# Patient Record
Sex: FEMALE | Race: WHITE | NOT HISPANIC OR LATINO | Employment: FULL TIME | URBAN - METROPOLITAN AREA
[De-identification: names, ages, dates, MRNs, and addresses within clinical notes are randomized per-mention and may not be internally consistent; named-entity substitution may affect disease eponyms.]

---

## 2017-06-04 ENCOUNTER — NURSE TRIAGE (OUTPATIENT)
Dept: NURSING | Facility: CLINIC | Age: 22
End: 2017-06-04

## 2018-01-30 ENCOUNTER — HOSPITAL ENCOUNTER (EMERGENCY)
Facility: CLINIC | Age: 23
Discharge: HOME | End: 2018-01-30
Attending: EMERGENCY MEDICINE | Admitting: EMERGENCY MEDICINE
Payer: COMMERCIAL

## 2018-01-30 ENCOUNTER — APPOINTMENT (OUTPATIENT)
Dept: ULTRASOUND IMAGING | Facility: CLINIC | Age: 23
End: 2018-01-30
Attending: EMERGENCY MEDICINE
Payer: COMMERCIAL

## 2018-01-30 VITALS
WEIGHT: 120 LBS | TEMPERATURE: 98.7 F | SYSTOLIC BLOOD PRESSURE: 105 MMHG | RESPIRATION RATE: 18 BRPM | DIASTOLIC BLOOD PRESSURE: 71 MMHG | OXYGEN SATURATION: 99 % | BODY MASS INDEX: 21.26 KG/M2 | HEART RATE: 84 BPM

## 2018-01-30 DIAGNOSIS — J06.9 VIRAL UPPER RESPIRATORY TRACT INFECTION: ICD-10-CM

## 2018-01-30 DIAGNOSIS — R10.32 ABDOMINAL PAIN, LEFT LOWER QUADRANT: ICD-10-CM

## 2018-01-30 LAB
ALBUMIN SERPL-MCNC: 4 G/DL (ref 3.4–5)
ALBUMIN UR-MCNC: NEGATIVE MG/DL
ALP SERPL-CCNC: 104 U/L (ref 40–150)
ALT SERPL W P-5'-P-CCNC: 26 U/L (ref 0–50)
ANION GAP SERPL CALCULATED.3IONS-SCNC: 7 MMOL/L (ref 3–14)
APPEARANCE UR: CLEAR
AST SERPL W P-5'-P-CCNC: 16 U/L (ref 0–45)
BASOPHILS # BLD AUTO: 0.1 10E9/L (ref 0–0.2)
BASOPHILS NFR BLD AUTO: 0.7 %
BILIRUB SERPL-MCNC: 0.6 MG/DL (ref 0.2–1.3)
BILIRUB UR QL STRIP: NEGATIVE
BUN SERPL-MCNC: 15 MG/DL (ref 7–30)
CALCIUM SERPL-MCNC: 9 MG/DL (ref 8.5–10.1)
CHLORIDE SERPL-SCNC: 105 MMOL/L (ref 94–109)
CO2 SERPL-SCNC: 27 MMOL/L (ref 20–32)
COLOR UR AUTO: YELLOW
CREAT SERPL-MCNC: 0.72 MG/DL (ref 0.52–1.04)
DIFFERENTIAL METHOD BLD: NORMAL
EOSINOPHIL # BLD AUTO: 0.4 10E9/L (ref 0–0.7)
EOSINOPHIL NFR BLD AUTO: 4.1 %
ERYTHROCYTE [DISTWIDTH] IN BLOOD BY AUTOMATED COUNT: 12.8 % (ref 10–15)
GFR SERPL CREATININE-BSD FRML MDRD: >90 ML/MIN/1.7M2
GLUCOSE SERPL-MCNC: 80 MG/DL (ref 70–99)
GLUCOSE UR STRIP-MCNC: NEGATIVE MG/DL
HCG UR QL: NEGATIVE
HCT VFR BLD AUTO: 46.1 % (ref 35–47)
HGB BLD-MCNC: 14.8 G/DL (ref 11.7–15.7)
HGB UR QL STRIP: NEGATIVE
IMM GRANULOCYTES # BLD: 0 10E9/L (ref 0–0.4)
IMM GRANULOCYTES NFR BLD: 0.4 %
KETONES UR STRIP-MCNC: 5 MG/DL
LEUKOCYTE ESTERASE UR QL STRIP: NEGATIVE
LYMPHOCYTES # BLD AUTO: 1.7 10E9/L (ref 0.8–5.3)
LYMPHOCYTES NFR BLD AUTO: 18.5 %
MCH RBC QN AUTO: 29.7 PG (ref 26.5–33)
MCHC RBC AUTO-ENTMCNC: 32.1 G/DL (ref 31.5–36.5)
MCV RBC AUTO: 93 FL (ref 78–100)
MONOCYTES # BLD AUTO: 0.7 10E9/L (ref 0–1.3)
MONOCYTES NFR BLD AUTO: 7.2 %
MUCOUS THREADS #/AREA URNS LPF: PRESENT /LPF
NEUTROPHILS # BLD AUTO: 6.3 10E9/L (ref 1.6–8.3)
NEUTROPHILS NFR BLD AUTO: 69.1 %
NITRATE UR QL: NEGATIVE
NRBC # BLD AUTO: 0 10*3/UL
NRBC BLD AUTO-RTO: 0 /100
PH UR STRIP: 6 PH (ref 5–7)
PLATELET # BLD AUTO: 299 10E9/L (ref 150–450)
POTASSIUM SERPL-SCNC: 3.9 MMOL/L (ref 3.4–5.3)
PROT SERPL-MCNC: 7.9 G/DL (ref 6.8–8.8)
RBC # BLD AUTO: 4.98 10E12/L (ref 3.8–5.2)
RBC #/AREA URNS AUTO: <1 /HPF (ref 0–2)
SODIUM SERPL-SCNC: 139 MMOL/L (ref 133–144)
SOURCE: ABNORMAL
SP GR UR STRIP: 1.02 (ref 1–1.03)
UROBILINOGEN UR STRIP-MCNC: 0 MG/DL (ref 0–2)
WBC # BLD AUTO: 9.1 10E9/L (ref 4–11)
WBC #/AREA URNS AUTO: 1 /HPF (ref 0–2)

## 2018-01-30 PROCEDURE — 76856 US EXAM PELVIC COMPLETE: CPT

## 2018-01-30 PROCEDURE — 85025 COMPLETE CBC W/AUTO DIFF WBC: CPT | Performed by: EMERGENCY MEDICINE

## 2018-01-30 PROCEDURE — 81025 URINE PREGNANCY TEST: CPT | Performed by: EMERGENCY MEDICINE

## 2018-01-30 PROCEDURE — 81001 URINALYSIS AUTO W/SCOPE: CPT | Performed by: EMERGENCY MEDICINE

## 2018-01-30 PROCEDURE — 25000128 H RX IP 250 OP 636: Performed by: EMERGENCY MEDICINE

## 2018-01-30 PROCEDURE — 96374 THER/PROPH/DIAG INJ IV PUSH: CPT

## 2018-01-30 PROCEDURE — 80053 COMPREHEN METABOLIC PANEL: CPT | Performed by: EMERGENCY MEDICINE

## 2018-01-30 PROCEDURE — 99284 EMERGENCY DEPT VISIT MOD MDM: CPT | Mod: 25

## 2018-01-30 RX ORDER — ONDANSETRON 2 MG/ML
4 INJECTION INTRAMUSCULAR; INTRAVENOUS ONCE
Status: COMPLETED | OUTPATIENT
Start: 2018-01-30 | End: 2018-01-30

## 2018-01-30 RX ORDER — ETONOGESTREL AND ETHINYL ESTRADIOL VAGINAL RING .015; .12 MG/D; MG/D
1 RING VAGINAL
COMMUNITY
End: 2020-06-29

## 2018-01-30 RX ADMIN — ONDANSETRON 4 MG: 2 INJECTION INTRAMUSCULAR; INTRAVENOUS at 10:45

## 2018-01-30 ASSESSMENT — ENCOUNTER SYMPTOMS
BACK PAIN: 1
NAUSEA: 0
DYSURIA: 0
COUGH: 1
CONSTIPATION: 0
VOMITING: 0
FEVER: 1
APPETITE CHANGE: 1
ABDOMINAL PAIN: 1

## 2018-01-30 NOTE — ED PROVIDER NOTES
History     Chief Complaint:  Abdominal Pain      The history is provided by the patient.      Darcy Pan is a 22 year old female with a history of ovarian cysts who presents to the ED for evaluation of abdominal pain. This morning when she woke up at 0600, the patient noticed that she had constant LLQ abdominal pain that slightly radiated to her left back with accompanying nausea.  She tried to eat breakfast, but was unable to finish. She went to work at a , but decided to come in to the ED for evaluation.    Here in the ED, the patient's LLQ abdominal pain is now intermittent. Bending over alleviates some of her pain. She additionally is congested, had a borderline fever of 99.9 F, and has a mild cough, but denies vomiting, dysuria, constipation, abnormal periods, rash, vaginal discharge or a history of sexual activity. She believes she has the flu as both her mother and sister recently tested positive for influenza. She has not taken pain medications yet.       Allergies:  Amoxicillin  Erythromycin      Medications:    Nuvaring  Imitrex    Past Medical History:    Uncomplicated asthma  Ovarian cysts    Past Surgical History:    Tonsillectomy and adenoidectomy    Family History:    No past pertinent family history.    Social History:  Presents alone.  Negative for tobacco use.  Negative for alcohol use  Marital Status:  Single [1]    Review of Systems   Constitutional: Positive for appetite change and fever.   HENT: Positive for congestion.    Respiratory: Positive for cough.    Gastrointestinal: Positive for abdominal pain. Negative for constipation, nausea and vomiting.   Genitourinary: Negative for dysuria, vaginal bleeding and vaginal discharge.   Musculoskeletal: Positive for back pain.   Skin: Negative for rash.   All other systems reviewed and are negative.    Physical Exam   First Vitals:  Patient Vitals for the past 24 hrs:   BP Temp Temp src Pulse Heart Rate Resp SpO2 Weight   01/30/18  1145 101/59 - - - - - 99 % -   01/30/18 1130 - - - - - - 100 % -   01/30/18 1115 - - - - - - 100 % -   01/30/18 1100 106/73 - - - - - 100 % -   01/30/18 1045 111/78 - - - - - 98 % -   01/30/18 1030 111/63 - - - - - 100 % -   01/30/18 1015 109/73 - - - - - 97 % -   01/30/18 1014 (!) 132/96 - - - 82 - 96 % -   01/30/18 0902 120/90 98.7  F (37.1  C) Temporal 84 - 18 99 % 54.4 kg (120 lb)       Physical Exam  Constitutional:  Well developed, Well nourished, completely comfortable appearing   HENT:  Bilateral external ears normal, Mucous membranes moist, Nose normal. Neck- Normal range of motion, Supple, mild congested sounding voice,   Respiratory:  Normal breath sounds, No respiratory distress, No wheezing,  Cardiovascular:  Normal heart rate, Normal rhythm, No murmurs,    GI:  Bowel sounds normal, Soft, Nondistended, no CVA tenderness, mild LLQ tenderness to palpation, no rebound or guarding  Musculoskeletal:  Intact distal pulses, No edema, grossly unremarkable range of motion   Integument:  Warm, Dry   Neurologic:  Alert, attentive and appropriately oriented  Psychiatric:  Mood and affect normal.     Emergency Department Course   Imaging:  Radiographic findings were communicated with the patient who voiced understanding of the findings.    US Pelvis complete without transvaginal:  IMPRESSION:  Trace pelvic fluid could be physiologic. No specific  acute abnormality is seen. As per radiology.     Laboratory:  CBC: WBC: 9.1, HGB: 14.8, PLT: 299  CMP: Glucose 80, o/w WNL (Creatinine: 0.72)  UA with micro: urineketon 5 (A), mucous present (A), o/w negative    HCG: negative     Interventions:  1045 Zofran 4 mg IV    Emergency Department Course:  Nursing notes and vitals reviewed. 1032 I performed an exam of the patient as documented above.     IV inserted. Medicine administered as documented above. Blood drawn. This was sent to the lab for further testing, results above. The patient provided a urine sample here in the  emergency department. This was sent for laboratory testing, findings above.     The patient was sent for a US Pelvis while in the emergency department, findings above.     1146 I rechecked the patient and discussed the results of her workup thus far.  She remains with minimal pain now, no new symptoms.    Findings and plan explained to the Patient. Patient discharged home with instructions regarding supportive care, medications, and reasons to return. The importance of close follow-up was reviewed.     I personally reviewed the laboratory results with the Patient and answered all related questions prior to discharge.     Impression & Plan    Medical Decision Making:  Darcy Pan presents with abdominal pain.  The differential diagnosis is broad and includes:  Appendicitis, cholecystitis, peptic ulcer disease, diverticulitis, bowel obstruction, ischemia, pancreatitis, GERD, amongst others.  Based on clinical exam, laboratory testing, and imaging, no significant etiologies were found.  The pain has improved without interventions in the ED.  The exact etiology of the pain is not clear at this time, however, symptoms had dramatically improved without intervention and not recurred.  She will be discharged, and was warned that persistent or worsening symptoms should prompt re-examination by a physician (ED if necessary) in 8-12 hours. She additionally mentioned upper respiratory tract infection.  There is no signs at this point of serious bacterial infection such as OM, RPA, epiglottitis, PTA, strep pharyngitis, pneumonia, sinusitis, meningitis, bacteremia, serious bacterial infection. Given clear lungs, fever curve, no hypoxia and no respiratory distress I do not feel she needs a CXR at this point as the probability of bacterial pneumonia is very unlikely.  Talked at length that influenza testing would not  of her symptoms, she is not in a high risk group and would not benefit from treatment,  therefore influenza testing was deferred at this time, and she is understanding of this.      Diagnosis:    ICD-10-CM   1. Abdominal pain, left lower quadrant R10.32   2. Viral upper respiratory tract infection J06.9    B97.89       Disposition:  discharged to home    Margo NOWAK, garry serving as a scribe on 1/30/2018 at 10:32 AM to personally document services performed by Yvonne Mcelroy MD based on my observations and the provider's statements to me.       Margo Singh  1/30/2018   [facility] EMERGENCY DEPARTMENT       Yvonne Mcelroy MD  01/30/18 1912

## 2018-01-30 NOTE — ED AVS SNAPSHOT
[facility] Emergency Department    [address]    [facility] [address]    Phone:  [phone]    Fax:  [phone]                                       Darcy Pan   MRN: [id number]    Department:  [facility] Emergency Department   Date of Visit:  1/30/2018           After Visit Summary Signature Page     I have received my discharge instructions, and my questions have been answered. I have discussed any challenges I see with this plan with the nurse or doctor.    ..........................................................................................................................................  Patient/Patient Representative Signature      ..........................................................................................................................................  Patient Representative Print Name and Relationship to Patient    ..................................................               ................................................  Date                                            Time    ..........................................................................................................................................  Reviewed by Signature/Title    ...................................................              ..............................................  Date                                                            Time

## 2018-01-30 NOTE — ED AVS SNAPSHOT
[facility] Emergency Department    [address]    [facility] [address]    Phone:  [phone]    Fax:  [phone]                                       Darcy Pan   MRN: [id number]    Department:  [facility] Emergency Department   Date of Visit:  1/30/2018           Patient Information     Date Of Birth          1995        Your diagnoses for this visit were:     Abdominal pain, left lower quadrant     Viral upper respiratory tract infection        You were seen by Yvonne Mcelroy MD.      Follow-up Information     Follow up with Radha Storm. Schedule an appointment as soon as possible for a visit in 3 days.    Why:  As needed if any symptoms persist    Contact information:    [address] [facility] [address] [phone]          Discharge Instructions       Get extra rest and drink plenty of fluids. You may take acetaminophen or ibuprofen according to package directions, with food, for fevers/aches.     If you were prescribed medications for your symptoms you may use them as instructed.    See your primary care clinic for followup this week if symptoms are not improving.    If you have any worsening/severe symptoms seek medical care right away.     Discharge Instructions  Abdominal Pain    Abdominal pain (belly pain) can be caused by many things. Your evaluation today does not show the exact cause for your pain. Your provider today has decided that it is unlikely your pain is due to a life threatening problem, or a problem requiring surgery or hospital admission. Sometimes those problems cannot be found right away, so it is very important that you follow up as directed.  Sometimes only the changes which occur over time allow the cause of your pain to be found.    Generally, every Emergency Department visit should have a follow-up clinic visit with either a primary or a specialty clinic/provider. Please follow-up as instructed by  your emergency provider today. With abdominal pain, we often recommend very close follow-up, such as the following day.    ADULTS:  Return to the Emergency Department right away if:      You get an oral temperature above 102oF or as directed by your provider.    You have blood in your stools. This may be bright red or appear as black, tarry stools.      You keep vomiting (throwing up) or cannot drink liquids.    You see blood when you vomit.     You cannot have a bowel movement or you cannot pass gas.    Your stomach gets bloated or bigger.    Your skin or the whites of your eyes look yellow.    You faint.    You have bloody, frequent or painful urination (peeing).    You have new symptoms or anything that worries you.    CHILDREN:  Return to the Emergency Department right away if your child has any of the above-listed symptoms or the following:      Pushes your hand away or screams/cries when his/her belly is touched.    You notice your child is very fussy or weak.    Your child is very tired and is too tired to eat or drink.    Your child is dehydrated.  Signs of dehydration can be:  o Significant change in the amount of wet diapers/urine.  o Your infant or child starts to have dry mouth and lips, or no saliva (spit) or tears.    PREGNANT WOMEN:  Return to the Emergency Department right away if you have any of the above-listed symptoms or the following:      You have bleeding, leaking fluid or passing tissue from the vagina.    You have worse pain or cramping, or pain in your shoulder or back.    You have vomiting that will not stop.    You have a temperature of 100oF or more.    Your baby is not moving as much as usual.    You faint.    You get a bad headache with or without eye problems and abdominal pain.    You have a seizure.    You have unusual discharge from your vagina and abdominal pain.    Abdominal pain is pretty common during pregnancy.  Your pain may or may not be related to your pregnancy. You should  "follow-up closely with your OB provider so they can evaluate you and your baby.  Until you follow-up with your regular provider, do the following:       Avoid sex and do not put anything in your vagina.    Drink clear fluids.    Only take medications approved by your provider.    MORE INFORMATION:    Appendicitis:  A possible cause of abdominal pain in any person who still has their appendix is acute appendicitis. Appendicitis is often hard to diagnose.  Testing does not always rule out early appendicitis or other causes of abdominal pain. Close follow-up with your provider and re-evaluations may be needed to figure out the reason for your abdominal pain.    Follow-up:  It is very important that you make an appointment with your clinic and go to the appointment.  If you do not follow-up with your primary provider, it may result in missing an important development which could result in permanent injury or disability and/or lasting pain.  If there is any problem keeping your appointment, call your provider or return to the Emergency Department.    Medications:  Take your medications as directed by your provider today.  Before using over-the-counter medications, ask your provider and make sure to take the medications as directed.  If you have any questions about medications, ask your provider.    Diet:  Resume your normal diet as much as possible, but do not eat fried, fatty or spicy foods while you have pain.  Do not drink alcohol or have caffeine.  Do not smoke tobacco.    Probiotics: If you have been given an antibiotic, you may want to also take a probiotic pill or eat yogurt with live cultures. Probiotics have \"good bacteria\" to help your intestines stay healthy. Studies have shown that probiotics help prevent diarrhea (loose stools) and other intestine problems (including C. diff infection) when you take antibiotics. You can buy these without a prescription in the pharmacy section of the store.     If you were " given a prescription for medicine here today, be sure to read all of the information (including the package insert) that comes with your prescription.  This will include important information about the medicine, its side effects, and any warnings that you need to know about.  The pharmacist who fills the prescription can provide more information and answer questions you may have about the medicine.  If you have questions or concerns that the pharmacist cannot address, please call or return to the Emergency Department.       Remember that you can always come back to the Emergency Department if you are not able to see your regular provider in the amount of time listed above, if you get any new symptoms, or if there is anything that worries you.    Discharge Instructions  Upper Respiratory Infection    The upper respiratory tract includes the sinuses, nasal passages, pharynx, and larynx. A URI, or upper respiratory infection, is an infection of any of the parts of the upper airway. Symptoms include runny nose, congestion, sneezing, sore throat, cough, and fever. URIs are almost always caused by a virus. Antibiotics do not help with viral infections, so are generally not prescribed. A URI is very contagious through coughing and nasal secretions; make sure you wash your hands often and clean surfaces after sneezing, coughing or touching them. While you should start to improve in 3 - 5 days, remember that sometimes a cough can linger for several weeks.    Generally, every Emergency Department visit should have a follow-up clinic visit with either a primary or a specialty clinic/provider. Please follow-up as instructed by your emergency provider today.    Return to the Emergency Department if:    Any of your symptoms get much worse.    You seem very sick, like being too weak to get up.    You have chest pain or shortness of breath.     You have a severe headache.    You are vomiting (throwing up) so much you cannot keep  fluids or medicines down.    You have confusion or seem unusually drowsy.    You have a seizure.    What can I do to help myself?    Fill any prescriptions the provider gave you and take them right away    If you have a fever, get plenty of rest and drink lots of fluids, especially water.    Using a humidifier or saline nose spray will also help loosen mucous.     Clothes or blankets will not change your fever. Do what is comfortable for you.    Bathing or sponging in lukewarm water may help you feel better.    Acetaminophen (Tylenol ) or ibuprofen (Advil , Motrin ) will help bring fever down and may help you feel more comfortable. Be sure to read and follow the package directions, and ask your provider if you have questions.    Do not drink alcohol.    Decongestants may help you feel better. You may use decongestant nose sprays Afrin  (oxymetazoline) or Dominguez-Synephrine  (phenylephrine hydrochloride) for up to 3 days, or may use a decongestant tablet like Sudafed  (pseudoephedrine).  If you were given a prescription for medicine here today, be sure to read all of the information (including the package insert) that comes with your prescription.  This will include important information about the medicine, its side effects, and any warnings that you need to know about.  The pharmacist who fills the prescription can provide more information and answer questions you may have about the medicine.  If you have questions or concerns that the pharmacist cannot address, please call or return to the Emergency Department.   Remember that you can always come back to the Emergency Department if you are not able to see your regular provider in the amount of time listed above, if you get any new symptoms, or if there is anything that worries you.      24 Hour Appointment Hotline       To make an appointment at any [facility], call [phone] ([phone]). If you don't have a family doctor or clinic, we will help you find  one. [facility] are conveniently located to serve the needs of you and your family.             Review of your medicines      Our records show that you are taking the medicines listed below. If these are incorrect, please call your family doctor or clinic.        Dose / Directions Last dose taken    etonogestrel-ethinyl estradiol 0.12-0.015 MG/24HR vaginal ring   Commonly known as:  NUVARING   Dose:  1 each        Place 1 each vaginally every 28 days   Refills:  0        IMITREX PO   Dose:  25 mg        Take 25 mg by mouth   Refills:  0                Procedures and tests performed during your visit     CBC with platelets differential    Comprehensive metabolic panel    HCG qualitative urine (UPT)    Peripheral IV catheter    UA reflex to Microscopic and Culture    US Pelvis Complete without Transvaginal      Orders Needing Specimen Collection     None      Pending Results     Date and Time Order Name Status Description    1/30/2018 1041 US Pelvis Complete without Transvaginal Preliminary             Pending Culture Results     No orders found from 1/28/2018 to 1/31/2018.            Pending Results Instructions     If you had any lab results that were not finalized at the time of your Discharge, you can call the ED Lab Result RN at [phone]. You will be contacted by this team for any positive Lab results or changes in treatment. The nurses are available 7 days a week from 10A to 6:30P.  You can leave a message 24 hours per day and they will return your call.        Test Results From Your Hospital Stay        1/30/2018 10:30 AM      Component Results     Component Value Ref Range & Units Status    WBC 9.1 4.0 - 11.0 10e9/L Final    RBC Count 4.98 3.8 - 5.2 10e12/L Final    Hemoglobin 14.8 11.7 - 15.7 g/dL Final    Hematocrit 46.1 35.0 - 47.0 % Final    MCV 93 78 - 100 fl Final    MCH 29.7 26.5 - 33.0 pg Final    MCHC 32.1 31.5 - 36.5 g/dL Final    RDW 12.8 10.0 - 15.0 % Final    Platelet Count 299 150 - 450  10e9/L Final    Diff Method Automated Method  Final    % Neutrophils 69.1 % Final    % Lymphocytes 18.5 % Final    % Monocytes 7.2 % Final    % Eosinophils 4.1 % Final    % Basophils 0.7 % Final    % Immature Granulocytes 0.4 % Final    Nucleated RBCs 0 0 /100 Final    Absolute Neutrophil 6.3 1.6 - 8.3 10e9/L Final    Absolute Lymphocytes 1.7 0.8 - 5.3 10e9/L Final    Absolute Monocytes 0.7 0.0 - 1.3 10e9/L Final    Absolute Eosinophils 0.4 0.0 - 0.7 10e9/L Final    Absolute Basophils 0.1 0.0 - 0.2 10e9/L Final    Abs Immature Granulocytes 0.0 0 - 0.4 10e9/L Final    Absolute Nucleated RBC 0.0  Final         1/30/2018 10:51 AM      Component Results     Component Value Ref Range & Units Status    Sodium 139 133 - 144 mmol/L Final    Potassium 3.9 3.4 - 5.3 mmol/L Final    Chloride 105 94 - 109 mmol/L Final    Carbon Dioxide 27 20 - 32 mmol/L Final    Anion Gap 7 3 - 14 mmol/L Final    Glucose 80 70 - 99 mg/dL Final    Urea Nitrogen 15 7 - 30 mg/dL Final    Creatinine 0.72 0.52 - 1.04 mg/dL Final    GFR Estimate >90 >60 mL/min/1.7m2 Final    Non  GFR Calc    GFR Estimate If Black >90 >60 mL/min/1.7m2 Final    African American GFR Calc    Calcium 9.0 8.5 - 10.1 mg/dL Final    Bilirubin Total 0.6 0.2 - 1.3 mg/dL Final    Albumin 4.0 3.4 - 5.0 g/dL Final    Protein Total 7.9 6.8 - 8.8 g/dL Final    Alkaline Phosphatase 104 40 - 150 U/L Final    ALT 26 0 - 50 U/L Final    AST 16 0 - 45 U/L Final         1/30/2018  1:17 PM      Component Results     Component Value Ref Range & Units Status    Color Urine Yellow  Final    Appearance Urine Clear  Final    Glucose Urine Negative NEG^Negative mg/dL Final    Bilirubin Urine Negative NEG^Negative Final    Ketones Urine 5 (A) NEG^Negative mg/dL Final    Specific Gravity Urine 1.018 1.003 - 1.035 Final    Blood Urine Negative NEG^Negative Final    pH Urine 6.0 5.0 - 7.0 pH Final    Protein Albumin Urine Negative NEG^Negative mg/dL Final    Urobilinogen mg/dL 0.0  0.0 - 2.0 mg/dL Final    Nitrite Urine Negative NEG^Negative Final    Leukocyte Esterase Urine Negative NEG^Negative Final    Source Midstream Urine  Final    RBC Urine <1 0 - 2 /HPF Final    WBC Urine 1 0 - 2 /HPF Final    Mucous Urine Present (A) NEG^Negative /LPF Final         1/30/2018 12:44 PM      Component Results     Component Value Ref Range & Units Status    HCG Qual Urine Negative NEG^Negative Final    This test is for screening purposes.  Results should be interpreted along with   the clinical picture.  Confirmation testing is available if warranted by   ordering [id number], HCG Quantitative Pregnancy.                 1/30/2018 11:38 AM      Narrative     PELVIC ULTRASOUND WITH ENDOVAGINAL TRANSDUCER    1/30/2018 11:33 AM     HISTORY:  Left lower quadrant pain. History of cysts.     TECHNIQUE:  Transabdominal scanning of the pelvis only due to patient  request.    COMPARISON:  Pelvic ultrasound 2/20/2015.    FINDINGS:  Endometrium is 3 mm thick. Uterus measures 6.3 x 3.7 x 2.9  cm. Right and left ovaries appear normal. Trace free pelvic fluid.        Impression     IMPRESSION:  Trace pelvic fluid could be physiologic. No specific  acute abnormality is seen.                Clinical Quality Measure: Blood Pressure Screening     Your blood pressure was checked while you were in the emergency department today. The last reading we obtained was  BP: 101/59 . Please read the guidelines below about what these numbers mean and what you should do about them.  If your systolic blood pressure (the top number) is less than 120 and your diastolic blood pressure (the bottom number) is less than 80, then your blood pressure is normal. There is nothing more that you need to do about it.  If your systolic blood pressure (the top number) is 120-139 or your diastolic blood pressure (the bottom number) is 80-89, your blood pressure may be higher than it should be. You should have your blood pressure rechecked within a year by a  "primary care provider.  If your systolic blood pressure (the top number) is 140 or greater or your diastolic blood pressure (the bottom number) is 90 or greater, you may have high blood pressure. High blood pressure is treatable, but if left untreated over time it can put you at risk for heart attack, stroke, or kidney failure. You should have your blood pressure rechecked by a primary care provider within the next 4 weeks.  If your provider in the emergency department today gave you specific instructions to follow-up with your doctor or provider even sooner than that, you should follow that instruction and not wait for up to 4 weeks for your follow-up visit.        Thank you for choosing [facility]       Thank you for choosing [facility] for your care. Our goal is always to provide you with excellent care. Hearing back from our patients is one way we can continue to improve our services. Please take a few minutes to complete the written survey that you may receive in the mail after you visit with us. Thank you!        [facility] Information     [facility] lets you send messages to your doctor, view your test results, renew your prescriptions, schedule appointments and more. To sign up, go to [email or web address] . Click on \"Log in\" on the left side of the screen, which will take you to the Welcome page. Then click on \"Sign up Now\" on the right side of the page.     You will be asked to enter the access code listed below, as well as some personal information. Please follow the directions to create your username and password.     Your access code is: [email or web address]  Expires: 2018  1:27 PM     Your access code will  in 90 days. If you need help or a new code, please call your [facility] clinic or [phone].        Care EveryWhere ID     This is your Care EveryWhere ID. This could be used by other organizations to access your [facility] medical records  [id number]        Equal Access to Services     MARY MATTHEWS AH: " Hadii andrei Vitale, wamaryda laura, qanormanta kaalpaulina najera. So [facility] [place] [phone].    ATENCIÓN: Si habla español, tiene a carlson disposición servicios gratuitos de asistencia lingüística. Llame al [phone].    We comply with applicable federal civil rights laws and [place] laws. We do not discriminate on the basis of race, color, national origin, age, disability, sex, sexual orientation, or gender identity.            After Visit Summary       This is your record. Keep this with you and show to your community pharmacist(s) and doctor(s) at your next visit.

## 2018-01-30 NOTE — ED NOTES
Pt presents for evaluation for having woke with LLQ abd pain this am. Pt also reports some nausea. Pt is A&O, ABC's intact.

## 2018-01-30 NOTE — DISCHARGE INSTRUCTIONS
Get extra rest and drink plenty of fluids. You may take acetaminophen or ibuprofen according to package directions, with food, for fevers/aches.     If you were prescribed medications for your symptoms you may use them as instructed.    See your primary care clinic for followup this week if symptoms are not improving.    If you have any worsening/severe symptoms seek medical care right away.     Discharge Instructions  Abdominal Pain    Abdominal pain (belly pain) can be caused by many things. Your evaluation today does not show the exact cause for your pain. Your provider today has decided that it is unlikely your pain is due to a life threatening problem, or a problem requiring surgery or hospital admission. Sometimes those problems cannot be found right away, so it is very important that you follow up as directed.  Sometimes only the changes which occur over time allow the cause of your pain to be found.    Generally, every Emergency Department visit should have a follow-up clinic visit with either a primary or a specialty clinic/provider. Please follow-up as instructed by your emergency provider today. With abdominal pain, we often recommend very close follow-up, such as the following day.    ADULTS:  Return to the Emergency Department right away if:      You get an oral temperature above 102oF or as directed by your provider.    You have blood in your stools. This may be bright red or appear as black, tarry stools.      You keep vomiting (throwing up) or cannot drink liquids.    You see blood when you vomit.     You cannot have a bowel movement or you cannot pass gas.    Your stomach gets bloated or bigger.    Your skin or the whites of your eyes look yellow.    You faint.    You have bloody, frequent or painful urination (peeing).    You have new symptoms or anything that worries you.    CHILDREN:  Return to the Emergency Department right away if your child has any of the above-listed symptoms or the  following:      Pushes your hand away or screams/cries when his/her belly is touched.    You notice your child is very fussy or weak.    Your child is very tired and is too tired to eat or drink.    Your child is dehydrated.  Signs of dehydration can be:  o Significant change in the amount of wet diapers/urine.  o Your infant or child starts to have dry mouth and lips, or no saliva (spit) or tears.    PREGNANT WOMEN:  Return to the Emergency Department right away if you have any of the above-listed symptoms or the following:      You have bleeding, leaking fluid or passing tissue from the vagina.    You have worse pain or cramping, or pain in your shoulder or back.    You have vomiting that will not stop.    You have a temperature of 100oF or more.    Your baby is not moving as much as usual.    You faint.    You get a bad headache with or without eye problems and abdominal pain.    You have a seizure.    You have unusual discharge from your vagina and abdominal pain.    Abdominal pain is pretty common during pregnancy.  Your pain may or may not be related to your pregnancy. You should follow-up closely with your OB provider so they can evaluate you and your baby.  Until you follow-up with your regular provider, do the following:       Avoid sex and do not put anything in your vagina.    Drink clear fluids.    Only take medications approved by your provider.    MORE INFORMATION:    Appendicitis:  A possible cause of abdominal pain in any person who still has their appendix is acute appendicitis. Appendicitis is often hard to diagnose.  Testing does not always rule out early appendicitis or other causes of abdominal pain. Close follow-up with your provider and re-evaluations may be needed to figure out the reason for your abdominal pain.    Follow-up:  It is very important that you make an appointment with your clinic and go to the appointment.  If you do not follow-up with your primary provider, it may result in  "missing an important development which could result in permanent injury or disability and/or lasting pain.  If there is any problem keeping your appointment, call your provider or return to the Emergency Department.    Medications:  Take your medications as directed by your provider today.  Before using over-the-counter medications, ask your provider and make sure to take the medications as directed.  If you have any questions about medications, ask your provider.    Diet:  Resume your normal diet as much as possible, but do not eat fried, fatty or spicy foods while you have pain.  Do not drink alcohol or have caffeine.  Do not smoke tobacco.    Probiotics: If you have been given an antibiotic, you may want to also take a probiotic pill or eat yogurt with live cultures. Probiotics have \"good bacteria\" to help your intestines stay healthy. Studies have shown that probiotics help prevent diarrhea (loose stools) and other intestine problems (including C. diff infection) when you take antibiotics. You can buy these without a prescription in the pharmacy section of the store.     If you were given a prescription for medicine here today, be sure to read all of the information (including the package insert) that comes with your prescription.  This will include important information about the medicine, its side effects, and any warnings that you need to know about.  The pharmacist who fills the prescription can provide more information and answer questions you may have about the medicine.  If you have questions or concerns that the pharmacist cannot address, please call or return to the Emergency Department.       Remember that you can always come back to the Emergency Department if you are not able to see your regular provider in the amount of time listed above, if you get any new symptoms, or if there is anything that worries you.    Discharge Instructions  Upper Respiratory Infection    The upper respiratory tract " includes the sinuses, nasal passages, pharynx, and larynx. A URI, or upper respiratory infection, is an infection of any of the parts of the upper airway. Symptoms include runny nose, congestion, sneezing, sore throat, cough, and fever. URIs are almost always caused by a virus. Antibiotics do not help with viral infections, so are generally not prescribed. A URI is very contagious through coughing and nasal secretions; make sure you wash your hands often and clean surfaces after sneezing, coughing or touching them. While you should start to improve in 3 - 5 days, remember that sometimes a cough can linger for several weeks.    Generally, every Emergency Department visit should have a follow-up clinic visit with either a primary or a specialty clinic/provider. Please follow-up as instructed by your emergency provider today.    Return to the Emergency Department if:    Any of your symptoms get much worse.    You seem very sick, like being too weak to get up.    You have chest pain or shortness of breath.     You have a severe headache.    You are vomiting (throwing up) so much you cannot keep fluids or medicines down.    You have confusion or seem unusually drowsy.    You have a seizure.    What can I do to help myself?    Fill any prescriptions the provider gave you and take them right away    If you have a fever, get plenty of rest and drink lots of fluids, especially water.    Using a humidifier or saline nose spray will also help loosen mucous.     Clothes or blankets will not change your fever. Do what is comfortable for you.    Bathing or sponging in lukewarm water may help you feel better.    Acetaminophen (Tylenol ) or ibuprofen (Advil , Motrin ) will help bring fever down and may help you feel more comfortable. Be sure to read and follow the package directions, and ask your provider if you have questions.    Do not drink alcohol.    Decongestants may help you feel better. You may use decongestant nose sprays  Afrin  (oxymetazoline) or Dominguez-Synephrine  (phenylephrine hydrochloride) for up to 3 days, or may use a decongestant tablet like Sudafed  (pseudoephedrine).  If you were given a prescription for medicine here today, be sure to read all of the information (including the package insert) that comes with your prescription.  This will include important information about the medicine, its side effects, and any warnings that you need to know about.  The pharmacist who fills the prescription can provide more information and answer questions you may have about the medicine.  If you have questions or concerns that the pharmacist cannot address, please call or return to the Emergency Department.   Remember that you can always come back to the Emergency Department if you are not able to see your regular provider in the amount of time listed above, if you get any new symptoms, or if there is anything that worries you.

## 2018-11-05 ENCOUNTER — HOSPITAL ENCOUNTER (EMERGENCY)
Facility: CLINIC | Age: 23
Discharge: HOME | End: 2018-11-05
Attending: NURSE PRACTITIONER | Admitting: NURSE PRACTITIONER
Payer: COMMERCIAL

## 2018-11-05 VITALS
HEART RATE: 110 BPM | SYSTOLIC BLOOD PRESSURE: 132 MMHG | TEMPERATURE: 98.1 F | RESPIRATION RATE: 16 BRPM | OXYGEN SATURATION: 100 % | DIASTOLIC BLOOD PRESSURE: 86 MMHG

## 2018-11-05 DIAGNOSIS — T23.001A BURN OF RIGHT HAND, UNSPECIFIED BURN DEGREE, UNSPECIFIED SITE OF HAND, INITIAL ENCOUNTER: ICD-10-CM

## 2018-11-05 PROCEDURE — 16020 DRESS/DEBRID P-THICK BURN S: CPT

## 2018-11-05 PROCEDURE — 99283 EMERGENCY DEPT VISIT LOW MDM: CPT

## 2018-11-05 PROCEDURE — 25000132 ZZH RX MED GY IP 250 OP 250 PS 637: Performed by: NURSE PRACTITIONER

## 2018-11-05 PROCEDURE — 25000132 ZZH RX MED GY IP 250 OP 250 PS 637: Performed by: EMERGENCY MEDICINE

## 2018-11-05 RX ORDER — ONDANSETRON 4 MG/1
4 TABLET, ORALLY DISINTEGRATING ORAL EVERY 8 HOURS PRN
Qty: 10 TABLET | Refills: 0 | Status: SHIPPED | OUTPATIENT
Start: 2018-11-05 | End: 2018-11-08

## 2018-11-05 RX ORDER — HYDROCODONE BITARTRATE AND ACETAMINOPHEN 5; 325 MG/1; MG/1
2 TABLET ORAL ONCE
Status: COMPLETED | OUTPATIENT
Start: 2018-11-05 | End: 2018-11-05

## 2018-11-05 RX ORDER — IBUPROFEN 800 MG/1
800 TABLET, FILM COATED ORAL ONCE
Status: COMPLETED | OUTPATIENT
Start: 2018-11-05 | End: 2018-11-05

## 2018-11-05 RX ORDER — BACITRACIN ZINC 500 [USP'U]/G
OINTMENT TOPICAL ONCE
Status: DISCONTINUED | OUTPATIENT
Start: 2018-11-05 | End: 2018-11-05 | Stop reason: HOSPADM

## 2018-11-05 RX ORDER — HYDROCODONE BITARTRATE AND ACETAMINOPHEN 5; 325 MG/1; MG/1
1 TABLET ORAL EVERY 6 HOURS PRN
Qty: 10 TABLET | Refills: 0 | Status: SHIPPED | OUTPATIENT
Start: 2018-11-05 | End: 2020-06-29

## 2018-11-05 RX ADMIN — HYDROCODONE BITARTRATE AND ACETAMINOPHEN 2 TABLET: 5; 325 TABLET ORAL at 16:24

## 2018-11-05 RX ADMIN — IBUPROFEN 800 MG: 800 TABLET, FILM COATED ORAL at 14:39

## 2018-11-05 ASSESSMENT — ENCOUNTER SYMPTOMS
NUMBNESS: 1
COLOR CHANGE: 1
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
## 2018-11-05 NOTE — LETTER
November 5, 2018      To Whom It May Concern:      Darcy Pan was seen in our Emergency Department today, 11/05/18.  I expect her condition to improve over the next 1-2 days.  She may return to school when improved.    Sincerely,        CATHERINE Angeles CNP

## 2018-11-05 NOTE — ED AVS SNAPSHOT
[facility] Emergency Department    [address]    [facility] [address]    Phone:  [phone]    Fax:  [phone]                                       Darcy Pan   MRN: [id number]    Department:  [facility] Emergency Department   Date of Visit:  11/5/2018           After Visit Summary Signature Page     I have received my discharge instructions, and my questions have been answered. I have discussed any challenges I see with this plan with the nurse or doctor.    ..........................................................................................................................................  Patient/Patient Representative Signature      ..........................................................................................................................................  Patient Representative Print Name and Relationship to Patient    ..................................................               ................................................  Date                                   Time    ..........................................................................................................................................  Reviewed by Signature/Title    ...................................................              ..............................................  Date                                               Time          22EPIC Rev 08/18

## 2018-11-07 ENCOUNTER — NURSE TRIAGE (OUTPATIENT)
Dept: NURSING | Facility: CLINIC | Age: 23
End: 2018-11-07

## 2018-11-07 ENCOUNTER — HOSPITAL ENCOUNTER (EMERGENCY)
Facility: CLINIC | Age: 23
Discharge: HOME | End: 2018-11-07
Attending: EMERGENCY MEDICINE | Admitting: EMERGENCY MEDICINE
Payer: COMMERCIAL

## 2018-11-07 VITALS
TEMPERATURE: 98.7 F | SYSTOLIC BLOOD PRESSURE: 120 MMHG | RESPIRATION RATE: 16 BRPM | OXYGEN SATURATION: 98 % | DIASTOLIC BLOOD PRESSURE: 77 MMHG

## 2018-11-07 DIAGNOSIS — T23.001A: ICD-10-CM

## 2018-11-07 DIAGNOSIS — T23.031A: ICD-10-CM

## 2018-11-07 PROCEDURE — 99283 EMERGENCY DEPT VISIT LOW MDM: CPT

## 2018-11-07 PROCEDURE — 16020 DRESS/DEBRID P-THICK BURN S: CPT

## 2018-11-07 RX ORDER — DOXYCYCLINE 100 MG/1
100 CAPSULE ORAL 2 TIMES DAILY
Qty: 14 CAPSULE | Refills: 0 | Status: SHIPPED | OUTPATIENT
Start: 2018-11-07 | End: 2018-11-14

## 2018-11-07 ASSESSMENT — ENCOUNTER SYMPTOMS
FEVER: 0
WOUND: 1
COLOR CHANGE: 1
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
## 2018-11-07 NOTE — ED AVS SNAPSHOT
[facility] Emergency Department    [address]    [facility] [address]    Phone:  [phone]    Fax:  [phone]                                       Darcy Pan   MRN: [id number]    Department:  [facility] Emergency Department   Date of Visit:  11/7/2018           After Visit Summary Signature Page     I have received my discharge instructions, and my questions have been answered. I have discussed any challenges I see with this plan with the nurse or doctor.    ..........................................................................................................................................  Patient/Patient Representative Signature      ..........................................................................................................................................  Patient Representative Print Name and Relationship to Patient    ..................................................               ................................................  Date                                   Time    ..........................................................................................................................................  Reviewed by Signature/Title    ...................................................              ..............................................  Date                                               Time          22EPIC Rev 08/18

## 2018-11-08 NOTE — DISCHARGE INSTRUCTIONS
Burn Emergencies    Electricity, chemicals, steam, very hot water, and fire can all cause serious burns. The care you receive for burns will depend on the type and severity of your injury. Many burns can be treated in an outpatient setting. If a burn needs inpatient treatment, it should be done in a burn center. Very severe burns need treatment in a burn center.   Types of burns  You may be most familiar with the traditional burn classification of first, second, and third-degree burns.    First-degree burns are usually mild. They affect only the outer layer of skin (epidermis). The skin is likely to be red, and there may be some pain and swelling. Most sunburns are first-degree burns.    Second-degree burns injure the second layer (dermis) of skin. The skin will be intensely red and may develop blisters. These burns are often very painful.    Third-degree burns involve all the layers of skin. Fat, nerves, muscles, and even bone may be burned. The skin may be charred black or appear very white. Pain is likely to be severe. If nerves are damaged, no pain may be felt at all.    A newer classification uses designations based on the depth of the burn and the need for surgical intervention.    Superficial burns involve the outer layer of skin (epidermis). They are painful and red, but do not blister    Superficial partial-thickness involves the outer layer and second layer (dermis) of skin. These burns usually blister within 24 hours that are painful, red and weeping.    Deep partial-thickness burns extend into the deeper dermis and damage hair follicles and glandular tissue. The burn is painful on pressure and the blister is wet or waxy and mottled or patchy in color.    Full-thickness burns extend through all layers of the skin and often to the underlying tissue. Blisters do not develop but skin can vary from waxy white to gray to charred and black.    Fourth degree burns are deep and can be life threatening,  extending through skin into underlying tissues, muscle or bone.  When to go to the emergency department  For a second- or third-degree burn, burns all the way around an arm or leg, burns caused by electricity or chemicals, burns involving the eyes, mouth, or airway, or any burn that covers large parts of the body, go to the emergency department or call 911 right away.  What to expect in the emergency department  Some activities that will happen include:    Medicine will be given to relieve pain.    The burn is cooled with moist cloths.    A chemical burn will be flushed with water and may be injected with medicine.    The burn is washed and any damaged tissue is removed. An antibiotic ointment may be applied and the burn covered with a dressing.    Fluids are given through a vein by intravenous access (IV) in the arm or other extremity not affected by burns.    If smoke was inhaled, the airways may be burned. If so, a tube may be placed in the windpipe (trachea) and connected to a ventilator to help breathing. A chest X-ray may be done to look for damage to the lungs from inhaling smoke. Blood and urine tests may be done to check the body s levels of oxygen and carbon dioxide.  Follow-up  Some burns can be cared for at home. Burns easily become infected, so careful cleaning and dressing changes are important. Very deep burns often require long-term medical treatment. For these burns, treatment is done in a burn center. Depending on the severity of the burn, skin grafts may be needed to replace damaged tissue.  Date Last Reviewed: 4/1/2017 2000-2018 [employer]. [address] [place] [address], [place] [address]. All rights reserved. This information is not intended as a substitute for professional medical care. Always follow your healthcare professional's instructions.
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
## 2019-03-19 ENCOUNTER — NURSE TRIAGE (OUTPATIENT)
Dept: NURSING | Facility: CLINIC | Age: 24
End: 2019-03-19

## 2019-03-19 ENCOUNTER — HOSPITAL ENCOUNTER (EMERGENCY)
Facility: CLINIC | Age: 24
Discharge: HOME | End: 2019-03-20
Attending: EMERGENCY MEDICINE | Admitting: EMERGENCY MEDICINE
Payer: COMMERCIAL

## 2019-03-19 ENCOUNTER — APPOINTMENT (OUTPATIENT)
Dept: GENERAL RADIOLOGY | Facility: CLINIC | Age: 24
End: 2019-03-19
Attending: EMERGENCY MEDICINE
Payer: COMMERCIAL

## 2019-03-19 VITALS
OXYGEN SATURATION: 96 % | RESPIRATION RATE: 18 BRPM | DIASTOLIC BLOOD PRESSURE: 100 MMHG | SYSTOLIC BLOOD PRESSURE: 118 MMHG | TEMPERATURE: 98 F

## 2019-03-19 DIAGNOSIS — S67.192A CRUSHING INJURY OF RIGHT MIDDLE FINGER, INITIAL ENCOUNTER: ICD-10-CM

## 2019-03-19 DIAGNOSIS — S60.131A SUBUNGUAL HEMATOMA OF RIGHT MIDDLE FINGER: ICD-10-CM

## 2019-03-19 PROCEDURE — 11740 EVACUATION SUBUNGUAL HMTMA: CPT

## 2019-03-19 PROCEDURE — 90471 IMMUNIZATION ADMIN: CPT

## 2019-03-19 PROCEDURE — 73140 X-RAY EXAM OF FINGER(S): CPT | Mod: RT

## 2019-03-19 PROCEDURE — 99283 EMERGENCY DEPT VISIT LOW MDM: CPT | Mod: 25

## 2019-03-19 RX ORDER — LIDOCAINE HYDROCHLORIDE AND EPINEPHRINE 10; 10 MG/ML; UG/ML
INJECTION, SOLUTION INFILTRATION; PERINEURAL
Status: DISCONTINUED
Start: 2019-03-19 | End: 2019-03-20 | Stop reason: HOSPADM

## 2019-03-19 RX ORDER — LIDOCAINE HYDROCHLORIDE AND EPINEPHRINE 10; 10 MG/ML; UG/ML
5 INJECTION, SOLUTION INFILTRATION; PERINEURAL ONCE
Status: DISCONTINUED | OUTPATIENT
Start: 2019-03-19 | End: 2019-03-20 | Stop reason: HOSPADM

## 2019-03-19 ASSESSMENT — ENCOUNTER SYMPTOMS
ARTHRALGIAS: 1
WOUND: 1

## 2019-03-19 NOTE — ED AVS SNAPSHOT
[facility] Emergency Department  [address]  [facility] [address]  Phone:  [phone]  Fax:  [phone]                                    Darcy Pan   MRN: [id number]    Department:  [facility] Emergency Department   Date of Visit:  3/19/2019           After Visit Summary Signature Page    I have received my discharge instructions, and my questions have been answered. I have discussed any challenges I see with this plan with the nurse or doctor.    ..........................................................................................................................................  Patient/Patient Representative Signature      ..........................................................................................................................................  Patient Representative Print Name and Relationship to Patient    ..................................................               ................................................  Date                                   Time    ..........................................................................................................................................  Reviewed by Signature/Title    ...................................................              ..............................................  Date                                               Time          22EPIC Rev 08/18

## 2019-03-20 PROCEDURE — 90715 TDAP VACCINE 7 YRS/> IM: CPT | Performed by: EMERGENCY MEDICINE

## 2019-03-20 PROCEDURE — 25000128 H RX IP 250 OP 636: Performed by: EMERGENCY MEDICINE

## 2019-03-20 PROCEDURE — 90471 IMMUNIZATION ADMIN: CPT

## 2019-03-20 RX ADMIN — CLOSTRIDIUM TETANI TOXOID ANTIGEN (FORMALDEHYDE INACTIVATED), CORYNEBACTERIUM DIPHTHERIAE TOXOID ANTIGEN (FORMALDEHYDE INACTIVATED), BORDETELLA PERTUSSIS TOXOID ANTIGEN (GLUTARALDEHYDE INACTIVATED), BORDETELLA PERTUSSIS FILAMENTOUS HEMAGGLUTININ ANTIGEN (FORMALDEHYDE INACTIVATED), BORDETELLA PERTUSSIS PERTACTIN ANTIGEN, AND BORDETELLA PERTUSSIS FIMBRIAE 2/3 ANTIGEN 0.5 ML: 5; 2; 2.5; 5; 3; 5 INJECTION, SUSPENSION INTRAMUSCULAR at 00:12
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
## 2019-03-20 NOTE — ED PROVIDER NOTES
History     Chief Complaint:  Laceration    HPI   Darcy Pan is a 23 year old female, who presents with her mother to the ED for the evaluation of laceration. The patient reports that just prior to her arrival to the ED, she slammed her right 3rd digit in her car door and that it started to bleed and was painful, prompting her to the ED. She states that the bleeding was controlled without intervention. The patient's mother states that the patient received 800 mg of Ibuprofen at 2200. The patient denies other issues.    Allergies:  Amoxicillin  Erythromycin     Medications:    Norco  Imitrex  Nuvaring    Past Medical History:    Uncomplicated asthma    Past Surgical History:    History reviewed. No pertinent surgical history.    Family History:    History reviewed. No pertinent family history.     Social History:  Smoking status: Never Smoker  Alcohol use: Yes   Marital Status:  Single [1]     Review of Systems   Musculoskeletal: Positive for arthralgias.   Skin: Positive for wound.   All other systems reviewed and are negative.        Physical Exam     Patient Vitals for the past 24 hrs:   BP Temp Temp src Heart Rate Resp SpO2   03/19/19 2233 (!) 118/100 98  F (36.7  C) Temporal 73 18 96 %        Physical Exam  Nursing note and vitals reviewed.  General: Patient is alert and interactive when I enter the room  Head:  The scalp, face, and head appear normal  CV:  Normal rate  Resp:  No respiratory distress   Musc:  Right distal third digit shows evidence of crush injury.  The eponychia appears to be peeled back, but nail is intact.  There is small subungual hematoma.   Skin:  No rash.   Neuro: Speech is normal and fluent. Face is symmetric. Moving all extremities well.  Sensation intact distal right third finger.  Psych:  Awake. Alert.  Normal affect.  Appropriate interactions.            Emergency Department Course   Imaging:  Radiographic findings were communicated with the patient and mother who voiced  understanding of the findings.  XR Finger Right G/E 2 Views  IMPRESSION: Negative for fracture. There is some soft tissue injury of  the distal third digit.  As read by Radiology.     Interventions:  1210, Adacel, 0.5 mLs, Intramuscular    Procedures:  Digital block and trephination.    After informed verbal consent, using sterile technique, a digital block was performed on the right third finger with 4 mL's of 1% lidocaine with epi.  This was successful.  Following this, the wound was cleaned and nail polish was removed.  No sutures indicated.  Using a 19-gauge needle, 2 holes were placed near the base of the nail at the site of a subungual hematoma and blood returned.  Patient tolerated the procedure well with no complications.    Emergency Department Course:  Past medical records, nursing notes, and vitals reviewed.  2248: I performed an exam of the patient and obtained history, as documented above.    Imaging independently reviewed and agree with radiologist interpretation.     0004: I rechecked the patient.  Findings and plan explained to the Patient and mother. Patient discharged home with instructions regarding supportive care, medications, and reasons to return. The importance of close follow-up was reviewed.       Impression & Plan    Medical Decision Making:  Patient presents with crush injury of her right middle finger.  There does not appear to be a fracture on x-ray.  On exam, there is a small subungual hematoma which I did trephinate.  There is not appear to be any nail injury, and while the eponychia is peeled back, I believe the nail is still in the nailbed and does not require any intervention.  Advised patient to follow-up with hand specialist in coming days for wound recheck.  She is discharged in stable condition.  All questions answered.  Diagnosis:    ICD-10-CM    1. Crushing injury of right middle finger, initial encounter S67.192A    2. Subungual hematoma of right middle finger S60.131A         Disposition:  discharged to home    Discharge Medications:  None    Scribe Disclosure:  I, Franco Carson, am serving as a scribe at 10:42 PM on 3/19/2019 to document services personally performed by Fred Phelps MD based on my observations and the provider's statements to me.      Franco Carson  3/19/2019   [facility] EMERGENCY DEPARTMENT       Fred Phelps MD  03/22/19 0690

## 2019-03-20 NOTE — TELEPHONE ENCOUNTER
"Unable to triage, mom not with Darcy.  Darcy reportedly slammed the tip of her finger in car door, \"had to open it to get it out\".  Mom has no other answers to questions.  S/s with which to present with to ED reviewed with mom (will see Darcy shortly).  Did ask to call back as needed.    "

## 2019-06-24 ENCOUNTER — NURSE TRIAGE (OUTPATIENT)
Dept: NURSING | Facility: CLINIC | Age: 24
End: 2019-06-24

## 2019-06-24 NOTE — TELEPHONE ENCOUNTER
LLQ abdominal pain since 8:00 am, constant and unable to stand up straight, some nausea present.  Advised ED.     Reason for Disposition    SEVERE abdominal pain (e.g., excruciating)    Protocols used: ABDOMINAL PAIN - FEMALE-A-OH

## 2020-02-05 ENCOUNTER — NURSE TRIAGE (OUTPATIENT)
Dept: NURSING | Facility: CLINIC | Age: 25
End: 2020-02-05

## 2020-02-05 NOTE — TELEPHONE ENCOUNTER
"Caller notes mild  pain in lateral  breast for one month; no lumps or change in appearance; did have pain in pre menses in past but this pain different and constant   Triage protocol  and home care reviewed  Caller advised to call back for any new or oworseningsymptoms   Caller needs to establish care with a new  clinic  Call transferred  to  .  Yvette Mane RN  FNA         Additional Information    Negative: Chest pain    Negative: Breastfeeding questions    Negative: Postpartum breast pain and swelling, is breastfeeding    Negative: Postpartum breast pain and swelling, not breastfeeding    Negative: Small spot, skin growth or mole    Negative: SEVERE breast pain and fever > 103 F (39.4 C)    Negative: Patient sounds very sick or weak to the triager    Negative: Breast looks infected (spreading redness, feels hot or painful to touch) and fever    Negative: Breast looks infected (spreading redness, feels hot or painful to touch) and no fever    Negative: Painful rash and multiple small blisters grouped together (i.e., dermatomal distribution or \"band\" or \"stripe\")    Negative: Cuts, burns, or bruises of breasts and suspicious history for the injury    Negative: Breast lump    Negative: Nipple discharge and bloody    Negative: Nipple is inverted (i.e., points inward)  (Exception: long-term physical characteristic, present for many years)    Negative: Dry flaking-peeling skin of nipple    Negative: Change in shape or appearance of breast    Negative: Dimpling of skin of breast  (i.e., skin looks like the outside of an orange peel)    Negative: Patient wants to be seen    Breast pain and cause is not known    Protocols used: BREAST SYMPTOMS-A-OH      "

## 2020-02-13 ENCOUNTER — OFFICE VISIT (OUTPATIENT)
Dept: FAMILY MEDICINE | Facility: CLINIC | Age: 25
End: 2020-02-13
Payer: COMMERCIAL

## 2020-02-13 VITALS
SYSTOLIC BLOOD PRESSURE: 106 MMHG | OXYGEN SATURATION: 98 % | HEART RATE: 80 BPM | HEIGHT: 64 IN | DIASTOLIC BLOOD PRESSURE: 66 MMHG | TEMPERATURE: 97.9 F | WEIGHT: 124.8 LBS | BODY MASS INDEX: 21.31 KG/M2

## 2020-02-13 DIAGNOSIS — N64.4 BREAST TENDERNESS IN FEMALE: Primary | ICD-10-CM

## 2020-02-13 PROCEDURE — 99203 OFFICE O/P NEW LOW 30 MIN: CPT | Performed by: FAMILY MEDICINE

## 2020-02-13 ASSESSMENT — MIFFLIN-ST. JEOR: SCORE: 1303.15

## 2020-02-13 NOTE — PROGRESS NOTES
Subjective         HPI   Increased breast tenderness- x 2 months LEFT lateral breast.  Thought it might be due to her Nuva Ring-- her OB was not concerned.  She continues to feel the pain and only in this one breast.  Denies any skin changes or nipple discharge.  +Family history of paternal aunt with breast CA in her 30s.    Otherwise healthy.    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    There is no problem list on file for this patient.    Past Surgical History:   Procedure Laterality Date     ENT SURGERY      tonsillectomy and adenoidectomy       Social History     Tobacco Use     Smoking status: Never Smoker     Smokeless tobacco: Never Used   Substance Use Topics     Alcohol use: Yes     Alcohol/week: 1.0 standard drinks     Types: 1 Shots of liquor per week     History reviewed. No pertinent family history.      Current Outpatient Medications   Medication Sig Dispense Refill     etonogestrel-ethinyl estradiol (NUVARING) 0.12-0.015 MG/24HR vaginal ring Place 1 each vaginally every 28 days       HYDROcodone-acetaminophen (NORCO) 5-325 MG per tablet Take 1 tablet by mouth every 6 hours as needed for severe pain (Patient not taking: Reported on 2/13/2020) 10 tablet 0     SUMAtriptan Succinate (IMITREX PO) Take 25 mg by mouth       Allergies   Allergen Reactions     Amoxicillin      Erythromycin      Recent Labs   Lab Test 01/30/18  1010 02/20/15  0712   ALT 26  --    CR 0.72 0.77   GFRESTIMATED >90 >90  Non African American GFR Calc     GFRESTBLACK >90 >90  African American GFR Calc     POTASSIUM 3.9 3.7      BP Readings from Last 3 Encounters:   02/13/20 106/66   03/19/19 (!) 118/100   11/07/18 120/77    Wt Readings from Last 3 Encounters:   02/13/20 56.6 kg (124 lb 12.8 oz)   01/30/18 54.4 kg (120 lb)   02/20/15 54.4 kg (120 lb) (45 %)*     * Growth percentiles are based on CDC (Girls, 2-20 Years) data.                    Reviewed and updated as needed this visit by Provider         Review of  "Systems   ROS COMP: Constitutional, HEENT, cardiovascular, pulmonary, GI, , musculoskeletal, neuro, skin, endocrine and psych systems are negative, except as otherwise noted.      Objective    /66   Pulse 80   Temp 97.9  F (36.6  C) (Oral)   Ht 1.613 m (5' 3.5\")   Wt 56.6 kg (124 lb 12.8 oz)   LMP 01/25/2020 (Exact Date)   SpO2 98%   BMI 21.76 kg/m      Physical Exam   GENERAL: healthy, alert and no distress  EYES: Eyes grossly normal to inspection, PERRL and conjunctivae and sclerae normal  HENT: ear canals and TM's normal, nose and mouth without ulcers or lesions  NECK: no adenopathy, no asymmetry, masses, or scars and thyroid normal to palpation  BREAST: B breasts with no masses, skin changes or nipple discharge.  Notes tenderness along LEFT lateral aspect of breast extending into tail of tissue into LEFT axilla  ABDOMEN: soft, nontender, no hepatosplenomegaly, no masses and bowel sounds normal  MS: no gross musculoskeletal defects noted, no edema  SKIN: no suspicious lesions or rashes  NEURO: Normal strength and tone, mentation intact and speech normal  PSYCH: mentation appears normal, affect normal/bright        Assessment & Plan     1. Breast tenderness in female    - US Breast Left Complete 4 Quadrants; Future  - MA Diagnostic Digital Bilateral; Future     Will order breast U/S for further eval.  Reassured this is likely hormonal- does not want to change contraception at this time.    Esperanza Fuentes MD  [facility]    "

## 2020-02-17 ENCOUNTER — ANCILLARY PROCEDURE (OUTPATIENT)
Dept: MAMMOGRAPHY | Facility: CLINIC | Age: 25
End: 2020-02-17
Payer: COMMERCIAL

## 2020-02-17 DIAGNOSIS — N64.4 BREAST TENDERNESS IN FEMALE: ICD-10-CM

## 2020-06-29 ENCOUNTER — VIRTUAL VISIT (OUTPATIENT)
Dept: FAMILY MEDICINE | Facility: CLINIC | Age: 25
End: 2020-06-29
Payer: COMMERCIAL

## 2020-06-29 DIAGNOSIS — Z30.44 ENCOUNTER FOR SURVEILLANCE OF VAGINAL RING HORMONAL CONTRACEPTIVE DEVICE: Primary | ICD-10-CM

## 2020-06-29 DIAGNOSIS — G43.809 OTHER MIGRAINE WITHOUT STATUS MIGRAINOSUS, NOT INTRACTABLE: ICD-10-CM

## 2020-06-29 PROCEDURE — 99213 OFFICE O/P EST LOW 20 MIN: CPT | Mod: TEL | Performed by: FAMILY MEDICINE

## 2020-06-29 RX ORDER — SUMATRIPTAN 50 MG/1
50 TABLET, FILM COATED ORAL
Qty: 9 TABLET | Refills: 3 | Status: ON HOLD | OUTPATIENT
Start: 2020-06-29 | End: 2022-05-04

## 2020-06-29 RX ORDER — ETONOGESTREL AND ETHINYL ESTRADIOL VAGINAL RING .015; .12 MG/D; MG/D
1 RING VAGINAL
Qty: 3 EACH | Refills: 4 | Status: SHIPPED | OUTPATIENT
Start: 2020-06-29 | End: 2021-02-11

## 2020-06-29 NOTE — PROGRESS NOTES
"Darcy Pan is a 24 year old female who is being evaluated via a billable telephone visit.      The patient has been notified of following:     \"This telephone visit will be conducted via a call between you and your physician/provider. We have found that certain health care needs can be provided without the need for a physical exam.  This service lets us provide the care you need with a short phone conversation.  If a prescription is necessary we can send it directly to your pharmacy.  If lab work is needed we can place an order for that and you can then stop by our lab to have the test done at a later time.    Telephone visits are billed at different rates depending on your insurance coverage. During this emergency period, for some insurers they may be billed the same as an in-person visit.  Please reach out to your insurance provider with any questions.    If during the course of the call the physician/provider feels a telephone visit is not appropriate, you will not be charged for this service.\"    Patient has given verbal consent for Telephone visit?  Yes    What phone number would you like to be contacted at? [phone]    How would you like to obtain your AVS? Ozzy Good     Darcy Pan is a 24 year old female who presents via phone visit today for the following health issues:    HPI  PAP 2018 [facility]    Needs refill of NuvaRing.  Has been on this for 6 years.  Notes some breast tenderness just before period.  Otherwise tolerates it well.    Hx of rare migraines.  Uses Imitrex 50 mg rarely for breakthrough HAs.  Needs refill as well.    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           There is no problem list on file for this patient.    Past Surgical History:   Procedure Laterality Date     ENT SURGERY      tonsillectomy and adenoidectomy       Social History     Tobacco Use     Smoking status: Never Smoker     Smokeless tobacco: Never Used   Substance Use Topics     " Alcohol use: Yes     Alcohol/week: 1.0 standard drinks     Types: 1 Shots of liquor per week     No family history on file.      Current Outpatient Medications   Medication Sig Dispense Refill     etonogestrel-ethinyl estradiol (NUVARING) 0.12-0.015 MG/24HR vaginal ring Place 1 each vaginally every 28 days       SUMAtriptan Succinate (IMITREX PO) Take 25 mg by mouth       Allergies   Allergen Reactions     Amoxicillin      Erythromycin      Recent Labs   Lab Test 01/30/18  1010 02/20/15  0712   ALT 26  --    CR 0.72 0.77   GFRESTIMATED >90 >90  Non African American GFR Calc     GFRESTBLACK >90 >90  African American GFR Calc     POTASSIUM 3.9 3.7      BP Readings from Last 3 Encounters:   02/13/20 106/66   03/19/19 (!) 118/100   11/07/18 120/77    Wt Readings from Last 3 Encounters:   02/13/20 56.6 kg (124 lb 12.8 oz)   01/30/18 54.4 kg (120 lb)   02/20/15 54.4 kg (120 lb) (45 %, Z= -0.12)*     * Growth percentiles are based on CDC (Girls, 2-20 Years) data.                    Reviewed and updated as needed this visit by Provider         Review of Systems   Constitutional, HEENT, cardiovascular, pulmonary, GI, , musculoskeletal, neuro, skin, endocrine and psych systems are negative, except as otherwise noted.       Objective   Reported vitals:  There were no vitals taken for this visit.   healthy, alert and no distress  PSYCH: Alert and oriented times 3; coherent speech, normal   rate and volume, able to articulate logical thoughts, able   to abstract reason, no tangential thoughts, no hallucinations   or delusions  Her affect is normal and pleasant  RESP: No cough, no audible wheezing, able to talk in full sentences  Remainder of exam unable to be completed due to telephone visits          1. Encounter for surveillance of vaginal ring hormonal contraceptive device    - etonogestrel-ethinyl estradiol (NUVARING) 0.12-0.015 MG/24HR vaginal ring; Place 1 each vaginally every 28 days Place 1 vaginally x 21 days, remove  for 7 days then repeat.  Dispense: 3 each; Refill: 4    NuvaRing refilled x 1 year.    2. Other migraine without status migrainosus, not intractable    - SUMAtriptan (IMITREX) 50 MG tablet; Take 1 tablet (50 mg) by mouth at onset of headache for migraine May repeat in 2 hours. Max 4 tablets/24 hours.  Dispense: 9 tablet; Refill: 3    Stable on regimen.      Phone call duration:  10 minutes    Esperanza Fuentes MD

## 2020-07-05 ENCOUNTER — NURSE TRIAGE (OUTPATIENT)
Dept: NURSING | Facility: CLINIC | Age: 25
End: 2020-07-05

## 2020-07-05 NOTE — TELEPHONE ENCOUNTER
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  Also has had contact with a family member that was confirmed positive for COVID-19 on 7/2/20.  This is day two of patient having a sore throat and asking if she should be tested.  Patient will first check with [employer] through her employer and [facility]NA provided information for [employer].

## 2020-07-07 ENCOUNTER — MYC MEDICAL ADVICE (OUTPATIENT)
Dept: FAMILY MEDICINE | Facility: CLINIC | Age: 25
End: 2020-07-07

## 2020-07-08 ENCOUNTER — E-VISIT (OUTPATIENT)
Dept: FAMILY MEDICINE | Facility: CLINIC | Age: 25
End: 2020-07-08
Payer: COMMERCIAL

## 2020-07-08 DIAGNOSIS — Z53.9 ERRONEOUS ENCOUNTER--DISREGARD: Primary | ICD-10-CM

## 2020-07-09 ENCOUNTER — VIRTUAL VISIT (OUTPATIENT)
Dept: FAMILY MEDICINE | Facility: CLINIC | Age: 25
End: 2020-07-09
Payer: COMMERCIAL

## 2020-07-09 ENCOUNTER — ALLIED HEALTH/NURSE VISIT (OUTPATIENT)
Dept: NURSING | Facility: CLINIC | Age: 25
End: 2020-07-09
Payer: COMMERCIAL

## 2020-07-09 DIAGNOSIS — R07.0 THROAT PAIN: Primary | ICD-10-CM

## 2020-07-09 DIAGNOSIS — R07.0 THROAT PAIN: ICD-10-CM

## 2020-07-09 LAB
DEPRECATED S PYO AG THROAT QL EIA: NEGATIVE
SPECIMEN SOURCE: NORMAL

## 2020-07-09 PROCEDURE — 87651 STREP A DNA AMP PROBE: CPT | Performed by: PHYSICIAN ASSISTANT

## 2020-07-09 PROCEDURE — 40001204 ZZHCL STATISTIC STREP A RAPID: Performed by: PHYSICIAN ASSISTANT

## 2020-07-09 PROCEDURE — 99213 OFFICE O/P EST LOW 20 MIN: CPT | Mod: TEL | Performed by: PHYSICIAN ASSISTANT

## 2020-07-09 PROCEDURE — 99213 OFFICE O/P EST LOW 20 MIN: CPT | Mod: 95

## 2020-07-09 RX ORDER — FLUTICASONE PROPIONATE 50 MCG
2 SPRAY, SUSPENSION (ML) NASAL DAILY
Qty: 18 G | Refills: 3 | Status: SHIPPED | OUTPATIENT
Start: 2020-07-09 | End: 2021-02-11

## 2020-07-09 RX ORDER — AZITHROMYCIN 250 MG/1
TABLET, FILM COATED ORAL
Qty: 6 TABLET | Refills: 0 | Status: SHIPPED | OUTPATIENT
Start: 2020-07-09 | End: 2020-07-14

## 2020-07-09 NOTE — PROGRESS NOTES
"Darcy Pan is a 24 year old female who is being evaluated via a billable telephone visit.      The patient has been notified of following:     \"This telephone visit will be conducted via a call between you and your physician/provider. We have found that certain health care needs can be provided without the need for a physical exam.  This service lets us provide the care you need with a short phone conversation.  If a prescription is necessary we can send it directly to your pharmacy.  If lab work is needed we can place an order for that and you can then stop by our lab to have the test done at a later time.    Telephone visits are billed at different rates depending on your insurance coverage. During this emergency period, for some insurers they may be billed the same as an in-person visit.  Please reach out to your insurance provider with any questions.    If during the course of the call the physician/provider feels a telephone visit is not appropriate, you will not be charged for this service.\"    Patient has given verbal consent for Telephone visit?  Yes    What phone number would you like to be contacted at? [phone]    How would you like to obtain your AVS? Destinyt    Latisha     Darcy Pan is a 24 year old female who presents via phone visit today for the following health issues:    HPI     RESPIRATORY SYMPTOMS      Duration: 5 days    Description  sore throat and headache    Severity: moderate    Accompanying signs and symptoms: None    History (predisposing factors):  none    Precipitating or alleviating factors: None  Therapies tried and outcome:  Acetaminophen, cough drops, OTC cold medicine- helps a little     COVID testing done by employer on 5/7/2020 and was negative.      Patient Active Problem List   Diagnosis     Other migraine without status migrainosus, not intractable     Encounter for surveillance of vaginal ring hormonal contraceptive device     Past Surgical History: "   Procedure Laterality Date     ENT SURGERY      tonsillectomy and adenoidectomy       Social History     Tobacco Use     Smoking status: Never Smoker     Smokeless tobacco: Never Used   Substance Use Topics     Alcohol use: Yes     Alcohol/week: 1.0 standard drinks     Types: 1 Shots of liquor per week     History reviewed. No pertinent family history.      Current Outpatient Medications   Medication Sig Dispense Refill     azithromycin (ZITHROMAX) 250 MG tablet Take 2 tablets (500 mg) by mouth daily for 1 day, THEN 1 tablet (250 mg) daily for 4 days. 6 tablet 0     etonogestrel-ethinyl estradiol (NUVARING) 0.12-0.015 MG/24HR vaginal ring Place 1 each vaginally every 28 days Place 1 vaginally x 21 days, remove for 7 days then repeat. 3 each 4     fluticasone (FLONASE) 50 MCG/ACT nasal spray Spray 2 sprays into both nostrils daily 18 g 3     SUMAtriptan (IMITREX) 50 MG tablet Take 1 tablet (50 mg) by mouth at onset of headache for migraine May repeat in 2 hours. Max 4 tablets/24 hours. 9 tablet 3     SUMAtriptan Succinate (IMITREX PO) Take 25 mg by mouth       Allergies   Allergen Reactions     Amoxicillin      Erythromycin      Recent Labs   Lab Test 01/30/18  1010 02/20/15  0712   ALT 26  --    CR 0.72 0.77   GFRESTIMATED >90 >90  Non African American GFR Calc     GFRESTBLACK >90 >90  African American GFR Calc     POTASSIUM 3.9 3.7      BP Readings from Last 3 Encounters:   02/13/20 106/66   03/19/19 (!) 118/100   11/07/18 120/77    Wt Readings from Last 3 Encounters:   02/13/20 56.6 kg (124 lb 12.8 oz)   01/30/18 54.4 kg (120 lb)   02/20/15 54.4 kg (120 lb) (45 %, Z= -0.12)*     * Growth percentiles are based on CDC (Girls, 2-20 Years) data.                    Reviewed and updated as needed this visit by Provider  Tobacco  Allergies  Meds  Problems  Med Hx  Surg Hx  Fam Hx         Review of Systems   Constitutional, HEENT, cardiovascular, pulmonary, GI, , musculoskeletal, neuro, skin, endocrine and psych  systems are negative, except as otherwise noted.       Objective   Reported vitals:  There were no vitals taken for this visit.   healthy, alert and no distress  PSYCH: Alert and oriented times 3; coherent speech, normal   rate and volume, able to articulate logical thoughts, able   to abstract reason, no tangential thoughts, no hallucinations   or delusions  Her affect is normal  RESP: No cough, no audible wheezing, able to talk in full sentences  Remainder of exam unable to be completed due to telephone visits    Diagnostic Test Results:  Labs reviewed in Epic        Assessment/Plan:  1. Throat pain  Viral vs strep - strep test to be done today; prescription sent to pharmacy in case results positive over the weekend.  - Streptococcus A Rapid Scr w Reflx to PCR  - fluticasone (FLONASE) 50 MCG/ACT nasal spray; Spray 2 sprays into both nostrils daily  Dispense: 18 g; Refill: 3  - azithromycin (ZITHROMAX) 250 MG tablet; Take 2 tablets (500 mg) by mouth daily for 1 day, THEN 1 tablet (250 mg) daily for 4 days.  Dispense: 6 tablet; Refill: 0    Return in about 1 year (around 7/9/2021) for Routine visit, or sooner with worsening symptoms.      Phone call duration:  7 minutes    Skyla Connors PA-C

## 2020-07-09 NOTE — TELEPHONE ENCOUNTER
Virtual visit completed today with Nadir Connors PA-C. No further action .    Thanks! Carrie Bailey RN

## 2020-07-09 NOTE — TELEPHONE ENCOUNTER
Looks like this was sent to Dr Marie-I don't see any notes or orders in regards to provider response from evisit yesterday. I called pt and set up a virtual visit today and told her she would not be charged for the evisit. Rebeka Blair RN

## 2020-07-09 NOTE — TELEPHONE ENCOUNTER
Patient called to inquire about the status of her E-Visit / request for strep test, lab order. Please follow up. Thanks!

## 2020-07-10 LAB
SPECIMEN SOURCE: NORMAL
STREP GROUP A PCR: NOT DETECTED

## 2020-07-10 NOTE — RESULT ENCOUNTER NOTE
"Liam Taversa,  Your attached rapid strep is negative, the culture is still pending.  Please contact the office with any questions or concerns.    Skyla Dozier \"Nadir\" MICHAEL Connors    "

## 2020-07-12 NOTE — RESULT ENCOUNTER NOTE
"Liam Taveras  Your attached throat culture is still negative.  Let's try the nasal spray for 5-7 days and go from there.  Please contact the office with any questions or concerns.    Skyla Dozier \"Nadir\" MICHAEL Connors    "

## 2020-10-07 ENCOUNTER — NURSE TRIAGE (OUTPATIENT)
Dept: NURSING | Facility: CLINIC | Age: 25
End: 2020-10-07

## 2020-10-07 NOTE — TELEPHONE ENCOUNTER
Patient reports she got bit or stung by something on Saturday evening (10/3). She is not sure what it was. The area is near her shoulder/neck. The area is swollen, warm, red, and painful ever since the bite. Reports her shoulder and neck are sore almost like she slept funny.   Advised per protocol to see PCP within 24 hours. Suggested she could go into an urgent care. Protocol and care advice reviewed. Caller states understanding of the recommended disposition.  Advised to call back if further questions or concerns.    Sandie Fuentes RN/[facility]      COVID 19 Nurse Triage Plan/Patient Instructions    Please be aware that novel coronavirus (COVID-19) may be circulating in the community. If you develop symptoms such as fever, cough, or SOB or if you have concerns about the presence of another infection including coronavirus (COVID-19), please contact your health care provider or visit [email or web address].     Disposition/Instructions    In-Person Visit with provider recommended. Reference Visit Selection Guide.    Thank you for taking steps to prevent the spread of this virus.  o Limit your contact with others.  o Wear a simple mask to cover your cough.  o Wash your hands well and often.    Resources    [facility]: About COVID-19: [email or web address].[email or web address].[email or web address]    CDC: What to Do If You're Sick: www.cdc.gov/coronavirus/2019-ncov/about/steps-when-sick.html    CDC: Ending Home Isolation: www.cdc.gov/coronavirus/2019-ncov/hcp/disposition-in-home-patients.html     CDC: Caring for Someone: www.cdc.gov/coronavirus/2019-ncov/if-you-are-sick/care-for-someone.html     [facility]: Interim Guidance for Hospital Discharge to Home: www.health.[facility].mn.us/diseases/coronavirus/hcp/hospdischarge.pdf    [facility] clinical trials (COVID-19 research studies): clinicalaffairs.[email or web address]/umn-clinical-trials     Below are the COVID-19 hotlines at the [place] Department of Health ([facility]). Interpreters are  available.   o For health questions: Call 514-033-7383 or 1-117.554.9177 (7 a.m. to 7 p.m.)  o For questions about schools and childcare: Call 854-306-5701 or 1-931.787.6501 (7 a.m. to 7 p.m.)       Additional Information    Negative: [1] Life-threatening reaction (anaphylaxis) in the past to same insect bite AND [2] < 2 hours since bite    Negative: Passed out (i.e., lost consciousness, collapsed and was not responding)    Negative: Difficulty breathing or wheezing    Negative: [1] Hoarseness or cough AND [2] sudden onset following bite    Negative: [1] Difficulty swallowing or slurred speech AND [2] sudden onset following bite    Negative: Sounds like a life-threatening emergency to the triager    Negative: Patient sounds very sick or weak to the triager    Negative: [1] Severe bite pain AND [2] not improved after 2 hours of pain medicine    Negative: [1] Fever AND [2] red area    Negative: [1] Fever AND [2] area is very tender to touch    Negative: [1] Red streak or red line AND [2] length > 2 inches (5 cm)    [1] Red or very tender (to touch) area AND [2] started over 24 hours after the bite    Protocols used: INSECT BITE-A-

## 2021-01-15 ENCOUNTER — HEALTH MAINTENANCE LETTER (OUTPATIENT)
Age: 26
End: 2021-01-15

## 2021-02-11 ENCOUNTER — OFFICE VISIT (OUTPATIENT)
Dept: FAMILY MEDICINE | Facility: CLINIC | Age: 26
End: 2021-02-11
Payer: COMMERCIAL

## 2021-02-11 VITALS
HEIGHT: 64 IN | BODY MASS INDEX: 21.72 KG/M2 | SYSTOLIC BLOOD PRESSURE: 110 MMHG | TEMPERATURE: 98.5 F | OXYGEN SATURATION: 99 % | WEIGHT: 127.25 LBS | DIASTOLIC BLOOD PRESSURE: 77 MMHG | HEART RATE: 85 BPM | RESPIRATION RATE: 16 BRPM

## 2021-02-11 DIAGNOSIS — Z00.00 ROUTINE GENERAL MEDICAL EXAMINATION AT A HEALTH CARE FACILITY: Primary | ICD-10-CM

## 2021-02-11 DIAGNOSIS — Z30.44 ENCOUNTER FOR SURVEILLANCE OF VAGINAL RING HORMONAL CONTRACEPTIVE DEVICE: ICD-10-CM

## 2021-02-11 PROCEDURE — 99395 PREV VISIT EST AGE 18-39: CPT | Performed by: FAMILY MEDICINE

## 2021-02-11 RX ORDER — ETONOGESTREL AND ETHINYL ESTRADIOL VAGINAL RING .015; .12 MG/D; MG/D
1 RING VAGINAL
Qty: 3 EACH | Refills: 4 | Status: SHIPPED | OUTPATIENT
Start: 2021-02-11 | End: 2022-01-27

## 2021-02-11 ASSESSMENT — ENCOUNTER SYMPTOMS
HEMATOCHEZIA: 0
DIARRHEA: 0
CONSTIPATION: 0
ABDOMINAL PAIN: 0
COUGH: 0
CHILLS: 0
HEMATURIA: 0

## 2021-02-11 ASSESSMENT — MIFFLIN-ST. JEOR: SCORE: 1309.26

## 2021-02-11 NOTE — PROGRESS NOTES
SUBJECTIVE:   CC: Darcy Pan is an 25 year old woman who presents for preventive health visit.     {Split Bill scripting  The purpose of this visit is to discuss your medical history and prevent health problems before you are sick. You may be responsible for a co-pay, coinsurance, or deductible if your visit today includes services such as checking on a sore throat, having an x-ray or lab test, or treating and evaluating a new or existing condition   Patient has been advised of split billing requirements and indicates understanding: Yes  Healthy Habits:     Getting at least 3 servings of Calcium per day:  Yes    Bi-annual eye exam:  NO    Dental care twice a year:  Yes    Sleep apnea or symptoms of sleep apnea:  None    Diet:  Regular (no restrictions)    Frequency of exercise:  4-5 days/week    Duration of exercise:  45-60 minutes    Taking medications regularly:  Yes    Medication side effects:  None    PHQ-2 Total Score: 0    Additional concerns today:  No    Current Chronic Medical Conditions  None    Social History  RN-working triage for ENT virtually right now.  Planning a wedding for November.  Lives with fiance.    HCM  PAP     Discuss contraception and family planning         Today's PHQ-2 Score:   PHQ-2 ( 1999 Pfizer) 2/11/2021   Q1: Little interest or pleasure in doing things 0   Q2: Feeling down, depressed or hopeless 0   PHQ-2 Score 0   Q1: Little interest or pleasure in doing things Not at all   Q2: Feeling down, depressed or hopeless Not at all   PHQ-2 Score 0       Abuse: Current or Past (Physical, Sexual or Emotional) - No  Do you feel safe in your environment? Yes        Social History     Tobacco Use     Smoking status: Never Smoker     Smokeless tobacco: Never Used   Substance Use Topics     Alcohol use: Yes     Alcohol/week: 1.0 standard drinks     Types: 1 Shots of liquor per week     Comment: 2-3 per week          Alcohol Use 2/11/2021   Prescreen: >3 drinks/day or >7  drinks/week? No         Reviewed orders with patient.  Reviewed health maintenance and updated orders accordingly - Yes  BP Readings from Last 3 Encounters:   02/11/21 110/77   02/13/20 106/66   03/19/19 (!) 118/100    Wt Readings from Last 3 Encounters:   02/11/21 57.7 kg (127 lb 4 oz)   02/13/20 56.6 kg (124 lb 12.8 oz)   01/30/18 54.4 kg (120 lb)                  Patient Active Problem List   Diagnosis     Other migraine without status migrainosus, not intractable     Encounter for surveillance of vaginal ring hormonal contraceptive device     Past Surgical History:   Procedure Laterality Date     ENT SURGERY      tonsillectomy and adenoidectomy       Social History     Tobacco Use     Smoking status: Never Smoker     Smokeless tobacco: Never Used   Substance Use Topics     Alcohol use: Yes     Alcohol/week: 1.0 standard drinks     Types: 1 Shots of liquor per week     Comment: 2-3 per week      History reviewed. No pertinent family history.      Current Outpatient Medications   Medication Sig Dispense Refill     etonogestrel-ethinyl estradiol (NUVARING) 0.12-0.015 MG/24HR vaginal ring Place 1 each vaginally every 28 days Place 1 vaginally x 21 days, remove for 7 days then repeat. 3 each 4     SUMAtriptan (IMITREX) 50 MG tablet Take 1 tablet (50 mg) by mouth at onset of headache for migraine May repeat in 2 hours. Max 4 tablets/24 hours. 9 tablet 3     Allergies   Allergen Reactions     Amoxicillin      Erythromycin      Polymyxin B-Trimethoprim Angioedema and Hives     Eye swelling      Recent Labs   Lab Test 01/30/18  1010 02/20/15  0712   ALT 26  --    CR 0.72 0.77   GFRESTIMATED >90 >90  Non African American GFR Calc     GFRESTBLACK >90 >90  African American GFR Calc     POTASSIUM 3.9 3.7            History of abnormal Pap smear: NO - age 21-29 PAP every 3 years recommended     Reviewed and updated as needed this visit by clinical staff  Tobacco  Allergies  Meds   Med Hx  Surg Hx  Fam Hx  Soc Hx     "    Reviewed and updated as needed this visit by Provider                    Review of Systems   Constitutional: Negative for chills.   HENT: Negative for congestion.    Respiratory: Negative for cough.    Cardiovascular: Negative for chest pain.   Gastrointestinal: Negative for abdominal pain, constipation, diarrhea and hematochezia.   Genitourinary: Negative for hematuria.          OBJECTIVE:   /77   Pulse 85   Temp 98.5  F (36.9  C) (Tympanic)   Resp 16   Ht 1.613 m (5' 3.5\")   Wt 57.7 kg (127 lb 4 oz)   LMP 01/26/2021 (Exact Date)   SpO2 99%   BMI 22.19 kg/m    Physical Exam  GENERAL: healthy, alert and no distress  EYES: Eyes grossly normal to inspection, PERRL and conjunctivae and sclerae normal  HENT: ear canals and TM's normal, nose and mouth without ulcers or lesions  NECK: no adenopathy, no asymmetry, masses, or scars and thyroid normal to palpation  RESP: lungs clear to auscultation - no rales, rhonchi or wheezes  BREAST: normal without masses, tenderness or nipple discharge and no palpable axillary masses or adenopathy  CV: regular rate and rhythm, normal S1 S2, no S3 or S4, no murmur, click or rub, no peripheral edema and peripheral pulses strong  ABDOMEN: soft, nontender, no hepatosplenomegaly, no masses and bowel sounds normal  MS: no gross musculoskeletal defects noted, no edema  SKIN: no suspicious lesions or rashes  NEURO: Normal strength and tone, mentation intact and speech normal  PSYCH: mentation appears normal, affect normal/bright    ASSESSMENT/PLAN:   1. Routine general medical examination at a health care facility    PAP UTD until .    2. Encounter for surveillance of vaginal ring hormonal contraceptive device    - etonogestrel-ethinyl estradiol (NUVARING) 0.12-0.015 MG/24HR vaginal ring; Place 1 each vaginally every 28 days Place 1 vaginally x 21 days, remove for 7 days then repeat.  Dispense: 3 each; Refill: 4    Refill of NuvaRIng- plans to come off of it after the " "wedding.    Patient has been advised of split billing requirements and indicates understanding: Yes  COUNSELING:  Reviewed preventive health counseling, as reflected in patient instructions       Regular exercise       Healthy diet/nutrition    Estimated body mass index is 22.19 kg/m  as calculated from the following:    Height as of this encounter: 1.613 m (5' 3.5\").    Weight as of this encounter: 57.7 kg (127 lb 4 oz).        She reports that she has never smoked. She has never used smokeless tobacco.      Counseling Resources:  ATP IV Guidelines  Pooled Cohorts Equation Calculator  Breast Cancer Risk Calculator  BRCA-Related Cancer Risk Assessment: FHS-7 Tool  FRAX Risk Assessment  ICSI Preventive Guidelines  Dietary Guidelines for Americans, 2010  USDA's MyPlate  ASA Prophylaxis  Lung CA Screening    Esperanza Fuentes MD  [facility]  "

## 2021-08-31 ENCOUNTER — NURSE TRIAGE (OUTPATIENT)
Dept: NURSING | Facility: CLINIC | Age: 26
End: 2021-08-31

## 2021-09-01 NOTE — TELEPHONE ENCOUNTER
Patient's mother Melva calling reporting patient had bug bite. States the bite site appear to be swollen. Currently not with the patient. RN advised patient to call herself or mother to call back when with patient for triage. Caller verbalized understanding. Denies further questions.      Chris Aponte RN  [facility]

## 2021-09-04 ENCOUNTER — HEALTH MAINTENANCE LETTER (OUTPATIENT)
Age: 26
End: 2021-09-04

## 2021-09-13 ENCOUNTER — NURSE TRIAGE (OUTPATIENT)
Dept: NURSING | Facility: CLINIC | Age: 26
End: 2021-09-13

## 2021-09-13 NOTE — TELEPHONE ENCOUNTER
Nausea and fatigue     Diarrhea and loose stools for 1 week.   Does not feel ill.  States she is only having 1-3 stools per day .  Taking pepto bismol , and kaopectate., and has not worked.    Tested negative for Covid. Last week.  Patient asking for appointment , and was sent to scheduling.    Claudia Olguin RN RN  [facility] Triage/refill nurse      Reason for Disposition    MILD diarrhea (e.g., 1-3 or more stools than normal in past 24 hours) diarrhea without known cause and present > 7 days    Patient wants to be seen    Additional Information    Negative: Vomiting also present and worse than the diarrhea    Negative: Blood in stool and without diarrhea    Negative: SEVERE abdominal pain (e.g., excruciating) and present > 1 hour    Negative: SEVERE abdominal pain and age > 60    Negative: Bloody, black, or tarry bowel movements (Exception: chronic-unchanged black-grey bowel movements and is taking iron pills or Pepto-bismol)    Negative: SEVERE diarrhea (e.g., 7 or more times / day more than normal) and age > 60 years    Negative: Constant abdominal pain lasting > 2 hours    Negative: Drinking very little and has signs of dehydration (e.g., no urine > 12 hours, very dry mouth, very lightheaded)    Negative: Patient sounds very sick or weak to the triager    Negative: SEVERE diarrhea (e.g., 7 or more times / day more than normal) and present > 24 hours (1 day)    Negative: MODERATE diarrhea (e.g., 4-6 times / day more than normal) and present > 48 hours (2 days)    Negative: MODERATE diarrhea (e.g., 4-6 times / day more than normal) and age > 70 years    Negative: Abdominal pain  (Exception: pain clears completely with each passage of diarrhea stool)    Negative: Fever > 101 F (38.3 C)    Negative: Blood in the stool    Negative: Mucus or pus in stool has been present > 2 days and diarrhea is more than mild    Negative: Weak immune system (e.g., HIV positive, cancer chemo, splenectomy, organ transplant,  chronic steroids)    Negative: Travel to a foreign country in past month    Negative: Recent antibiotic therapy (i.e., within last 2 months) and diarrhea present > 3 days since antibiotic was stopped    Negative: Recent hospitalization and diarrhea present > 3 days    Negative: Tube feedings (e.g., nasogastric, g-tube, j-tube)    Protocols used: DIARRHEA-A-OH

## 2021-10-06 ENCOUNTER — NURSE TRIAGE (OUTPATIENT)
Dept: NURSING | Facility: CLINIC | Age: 26
End: 2021-10-06

## 2021-10-06 NOTE — TELEPHONE ENCOUNTER
Patient calling requesting information on Frank and Frank Vaccine and risk factors. Reviewed CDC information on hormonal birth control risk with Frank and Frank Vaccine. People using hormonal birth control can receive any FDA-authorized COVID-19 vaccine. There are no recommendations to stop taking hormonal birth control (birth control pills, implant, patch, ring, or shot) before or after receiving the J&J/Silvia COVID-19 Vaccine. Although the risk of blood clots is increased with some hormonal birth control methods (for example, some types of birth control pills, patch, and ring), based on the available data, experts believe that these factors do not make people more likely to develop TTS after receiving the J&J/Silvia COVID-19 Vaccine. TTS is a rare condition that involves blood clots together with low platelet levels.    Caller verbalized understanding. Denies further questions.    Patient requests to establish care with new provider. Transferred to Central Scheduling per request.    Shelly Guy RN  [facility]    COVID 19 Nurse Triage Plan/Patient Instructions    Please be aware that novel coronavirus (COVID-19) may be circulating in the community. If you develop symptoms such as fever, cough, or SOB or if you have concerns about the presence of another infection including coronavirus (COVID-19), please contact your health care provider or visit [email or web address].     Disposition/Instructions    Home care recommended. Follow home care protocol based instructions.    Thank you for taking steps to prevent the spread of this virus.  o Limit your contact with others.  o Wear a simple mask to cover your cough.  o Wash your hands well and often.    Resources    [facility]: About COVID-19: [email or web address]    CDC: What to Do If You're Sick: www.cdc.gov/coronavirus/2019-ncov/about/steps-when-sick.html    CDC: Ending Home Isolation:  www.cdc.gov/coronavirus/2019-ncov/hcp/disposition-in-home-patients.html     CDC: Caring for Someone: www.cdc.gov/coronavirus/2019-ncov/if-you-are-sick/care-for-someone.html     [facility]: Interim Guidance for Hospital Discharge to Home: www.health.[facility].mn.us/diseases/coronavirus/hcp/hospdischarge.pdf    [facility] clinical trials (COVID-19 research studies): clinicalaffairs.[facility]-clinical-trials     Below are the COVID-19 hotlines at the [place] Department of Health ([facility]). Interpreters are available.   o For health questions: Call 501-220-4018 or 1-762.820.8023 (7 a.m. to 7 p.m.)  o For questions about schools and childcare: Call 659-972-0080 or 1-507.724.6301 (7 a.m. to 7 p.m.)                     Reason for Disposition    COVID-19 vaccine, Frequently Asked Questions (FAQs)    Additional Information    Negative: [1] Difficulty breathing or swallowing AND [2] starts within 2 hours after injection    Negative: Sounds like a life-threatening emergency to the triager    Negative: Fever > 104 F (40 C)    Negative: Sounds like a severe, unusual reaction to the triager    Negative: [1] Has NOT completed COVID-19 vaccine series AND [2] COVID-19 exposure AND [2] AND [3] typical COVID-19 symptoms    Negative: [1] Has NOT completed COVID-19 vaccine series AND [2]  COVID-19 exposure AND [3] no symptoms    Negative: [1] COVID-19 vaccine series completed (fully vaccinated) in past 3 months AND [2] new-onset of COVID-19 symptoms BUT [3] no known exposure    Negative: [1] Typical COVID-19 symptoms AND [2] symptoms that are NOT expected from vaccine (e.g., cough, difficulty breathing, loss of taste or smell, runny nose, sore throat)    Negative: [1] Typical COVID-19 symptoms AND [2] started > 3 days after getting vaccine    Negative: [1] Redness or red streak around the injection site AND [2] started > 48 hours after getting vaccine AND [3] fever    Negative: [1] Fever > 101 F (38.3 C) AND [2] age > 60 years AND [3]  started > 48 hours after getting vaccine    Negative: [1] Fever > 100.0 F (37.8 C) AND [2] bedridden (e.g., nursing home patient, CVA, chronic illness, recovering from surgery) AND [3] started > 48 hours after getting vaccine    Negative: [1] Fever > 100.0 F (37.8 C) AND [2] diabetes mellitus or weak immune system (e.g., HIV positive, cancer chemo, splenectomy, organ transplant, chronic steroids) AND [3] started > 48 hours after getting vaccine    Negative: [1] Redness or red streak around the injection site AND [2] started > 48 hours after getting vaccine AND [3] no fever  (Exception: red area < 1 inch or 2.5 cm wide)    Negative: [1] Pain, tenderness, or swelling at the injection site AND [2] over 3 days (72 hours) since vaccine AND [3] getting worse    Negative: Fever > 100.0 F (37.8 C) present > 3 days (72 hours)    Negative: [1] Fever > 100.0 F (37.8 C) AND [2] healthcare worker    Negative: [1] Pain, tenderness, or swelling at the injection site AND [2] lasts > 7 days    Negative: [1] Lymph node swelling (i.e., armpit or neck on side of vaccine) AND [2] lasts > 3 weeks    Negative: [1] Requesting COVID-19 vaccine AND [2] healthcare worker (e.g., EMS first responders, doctors, nurses)    Negative: [1] Requesting COVID-19 vaccine AND [2] resident of a long-term care facility (e.g., nursing home)    Negative: [1] Requesting COVID-19 vaccine AND [2] vaccine available in the community for this patient group    Negative: COVID-19 vaccine, injection site reaction (e.g., pain, redness, swelling), question about    Negative: COVID-19 vaccine, systemic reactions (e.g., fatigue, fever, muscle aches), questions about    Protocols used: CORONAVIRUS (COVID-19) VACCINE QUESTIONS AND [id number] 3.25

## 2022-01-18 ENCOUNTER — TELEPHONE (OUTPATIENT)
Dept: MIDWIFE SERVICES | Facility: CLINIC | Age: 27
End: 2022-01-18
Payer: COMMERCIAL

## 2022-01-18 NOTE — TELEPHONE ENCOUNTER
Pt is about 4 wks along, she has IOB with us 2/28. She has been having cramping, but more on the left side than the right. Can you please call to advise? Clinic is booked today.  [facility] has one 20 min appt tomorrow, [facility] has a couple 20 min appts tomorrow but no 40 min for new pt appts.

## 2022-01-26 ENCOUNTER — MYC MEDICAL ADVICE (OUTPATIENT)
Dept: FAMILY MEDICINE | Facility: CLINIC | Age: 27
End: 2022-01-26
Payer: COMMERCIAL

## 2022-01-26 NOTE — TELEPHONE ENCOUNTER
Writer responded via [employer].    PAMELA BachN, RN  [facility]

## 2022-01-26 NOTE — PATIENT INSTRUCTIONS
Patient Education     Pregnancy    Your exam today shows that you are pregnant.  Pregnancy symptoms  During pregnancy your body s hormones change. This causes physical and emotional changes. This is normal. Knowing what to expect is important for your piece of mind and so you know when to seek help for a problem. Here are some of the most common symptoms:     Morning sickness or nausea. This can happen any time of the day or night.    Tender, swollen breasts    Need to urinate frequently    Tiredness or fatigue    Dizziness    Indigestion or heartburn    Food cravings or turn-offs    Constipation    Emotional changes. This can range from anxiety to excitement to depression.  General care for a healthy pregnancy  Here are things you can do to help make sure your baby is born healthy:    Rest when you feel tired. This is especially true in the later months of pregnancy.    Drink more fluids. Your body needs more fluids than you may be used to. Drink 8 to10 glasses of juice, milk, or water every day.    Eat well-balanced meals. Eat at regular times to give your body enough protein. You can expect to gain about 30 pounds during the pregnancy. Don t try to diet or lose weight while you are pregnant.    Take a prenatal vitamin every day. This helps you meet the extra nutritional needs of pregnancy.    Don t take any other medicine during your pregnancy unless your healthcare provider tells you to. This includes prescription medicines and those you buy over the counter. Many medicines can harm the growing baby.    If you have nausea or vomiting, don t eat greasy or fried foods. Eat several smaller meals throughout the day rather than 3 large meals.    If you smoke, you must stop. The nicotine you breathe in goes right to the baby.    Stay away from alcohol, even in moderate amounts. Daily drinking will harm your baby and can cause permanent brain damage.    Don t use recreational drugs, especially cocaine, crack, and  heroin. These will harm your baby. Also avoid marijuana.    If you were using recreational drugs or prescribed medicine when you found out that you were pregnant, talk with your healthcare provider about possible effects on your growing baby.    If you have medical problems that you need to take medicine for, talk with your healthcare provider.  Follow-up care  Call your healthcare provider to arrange for prenatal care. Prenatal care is important. You can see your family provider, a pregnancy specialist (obstetrician), a midwife, or a primary care clinic.   When to seek medical advice  Call your healthcare provider right away if any of these occur:    Vaginal bleeding    Pain in your belly (abdomen) or back that is moderate or severe    Lots of vomiting, or you can t keep any fluids down for 6 hours    Burning feeling when you urinate    Headache, dizziness, or rapid weight gain    Fever    Vision changes or blurred vision  [place] last reviewed this educational content on 11/1/2019 2000-2021 [employer]. All rights reserved. This information is not intended as a substitute for professional medical care. Always follow your healthcare professional's instructions.

## 2022-01-27 ENCOUNTER — VIRTUAL VISIT (OUTPATIENT)
Dept: FAMILY MEDICINE | Facility: CLINIC | Age: 27
End: 2022-01-27
Payer: COMMERCIAL

## 2022-01-27 DIAGNOSIS — Z3A.01 LESS THAN 8 WEEKS GESTATION OF PREGNANCY: Primary | ICD-10-CM

## 2022-01-27 PROCEDURE — 99212 OFFICE O/P EST SF 10 MIN: CPT | Mod: 95 | Performed by: FAMILY MEDICINE

## 2022-01-27 NOTE — PROGRESS NOTES
Carmella is a 26 year old who is being evaluated via a billable telephone visit.      What phone number would you like to be contacted at? [phone]  How would you like to obtain your AVS? Ozzy    Problem List Items Addressed This Visit     None      Visit Diagnoses     Less than 8 weeks gestation of pregnancy    -  Primary      I discussed with the patient that I can understand her feeling uncomfortable traveling at this time, early pregnancy and the symptoms she is having don't require travel restrictions. I have encouraged her to talk with her employer to discuss possible changes in her work demands or potentially seeking out a job that better fits her comfort/needs at this time.      Subjective: Darcy is a 26 year old who is being evaluated via a billable telephone visit.  The patient is requesting a note for work to restrict travel. She reports she is about 6 weeks pregnant. She is having nausea and fatigue starting this week. She is also feeling emotional. She reports that last week she had to go into the ER when traveling for abdominal pain and a 4.8 mm ovarian cyst was identified. No other abnormal findings. She travels a lot for work and doesn't feel comfortable being out of network given her pregnancy. She hasn't talked with her employer about these concerns.    Objective:   Vitals:  No vitals were obtained today due to virtual visit.  Patient is awake and alert, answering questions appropriately.      Phone call duration: 5 minutes

## 2022-02-07 ENCOUNTER — OFFICE VISIT (OUTPATIENT)
Dept: PEDIATRICS | Facility: CLINIC | Age: 27
End: 2022-02-07
Payer: COMMERCIAL

## 2022-02-07 VITALS
WEIGHT: 130.1 LBS | TEMPERATURE: 98.4 F | HEIGHT: 63 IN | BODY MASS INDEX: 23.05 KG/M2 | DIASTOLIC BLOOD PRESSURE: 62 MMHG | SYSTOLIC BLOOD PRESSURE: 118 MMHG

## 2022-02-07 DIAGNOSIS — O21.0 MORNING SICKNESS: Primary | ICD-10-CM

## 2022-02-07 DIAGNOSIS — Z3A.01 PREGNANCY WITH 7 COMPLETED WEEKS GESTATION: ICD-10-CM

## 2022-02-07 PROCEDURE — 99213 OFFICE O/P EST LOW 20 MIN: CPT

## 2022-02-07 ASSESSMENT — MIFFLIN-ST. JEOR: SCORE: 1309.26

## 2022-02-08 PROBLEM — Z3A.01: Status: ACTIVE | Noted: 2022-02-08

## 2022-02-08 PROBLEM — O21.0 MORNING SICKNESS: Status: ACTIVE | Noted: 2022-02-08

## 2022-02-08 NOTE — PROGRESS NOTES
"Assessment & Plan     Darcy was seen today for mouth/lip problem.    Diagnoses and all orders for this visit:    Morning sickness    Pregnancy with 7 completed weeks gestation    Suggested trial of Unisom for nausea/vomiting, continue B6 supplementation.  First prenatal visit is scheduled in 1 week, recommended calling and being seen sooner if no dramatic improvement in symptoms within the next 24-48 hours.  Reviewed dehydration prevention using ORT.  Reassurance given regarding her examination today.  Continue PNVs.    [id number]}  Follow Up  No follow-ups on file.    Dany Negro MD      Subjective   Darcy is a 26 year old female who presents for   Chief Complaint   Patient presents with     Mouth/Lip Problem     white coating in mouth, bad morning sickness for about 2 weeks,         HPI   Carmella is , and has had 4-5 days of food aversion, nausea, and vomiting 2-5 times a day.  She is keeping little or no food down, but is able to drink sips of water without vomiting.  She also wonders if she has thrush, since her tongue has become \"whiter.\"  No buccal plaques.  No recent antibiotic use.  She reports having thrush in the past, associated with antibiotic use after wisdom teeth extraction.  She is pregnant, LMP 21, with EDC 22.  She had an US 2 weeks ago, due to increased cramping, which apparently showed a right ovarian cyst.  She has been taking PNVs and B6.  Her first midwife appointment is scheduled for 22.  She is very happy to be pregnant.  She has a history of migraines, with vomiting, every 3-6 months.  No headaches currently.  No visual disturbance.  No history of immunosupression or chronic illness.      Objective    Blood pressure 118/62, temperature 98.4  F (36.9  C), temperature source Oral, height 5' 3\" (1.6 m), weight 130 lb 1.6 oz (59 kg), last menstrual period 2021, not currently breastfeeding.    Physical Exam   GENERAL: Active, alert, in no acute " distress.  SKIN: Clear. No significant rash (pt did not disrobe for exam)  HEAD: Normocephalic.  MOUTH/THROAT: Clear. No oral lesions.  Scant white lingual plaque.  NECK: Supple, no masses. No thyromegaly.  LYMPH NODES: No adenopathy  LUNGS: Clear. No rales, rhonchi, wheezing or retractions  HEART: Regular rhythm. Normal S1/S2. No murmurs.  ABDOMEN: Soft, non-tender, not distended, no masses or hepatosplenomegaly. Bowel sounds normal.   NEURO: Cranial nerves grossly intact, normal speech and gait.  PSYCH:  Mood is bright, affect congruent.

## 2022-02-26 ENCOUNTER — HOSPITAL ENCOUNTER (EMERGENCY)
Facility: CLINIC | Age: 27
Discharge: HOME | End: 2022-02-26
Attending: EMERGENCY MEDICINE | Admitting: EMERGENCY MEDICINE
Payer: COMMERCIAL

## 2022-02-26 ENCOUNTER — APPOINTMENT (OUTPATIENT)
Dept: ULTRASOUND IMAGING | Facility: CLINIC | Age: 27
End: 2022-02-26
Attending: EMERGENCY MEDICINE
Payer: COMMERCIAL

## 2022-02-26 VITALS
BODY MASS INDEX: 23.03 KG/M2 | OXYGEN SATURATION: 99 % | SYSTOLIC BLOOD PRESSURE: 119 MMHG | HEART RATE: 84 BPM | TEMPERATURE: 97.8 F | WEIGHT: 130 LBS | RESPIRATION RATE: 16 BRPM | DIASTOLIC BLOOD PRESSURE: 69 MMHG

## 2022-02-26 LAB
ABO/RH(D): NORMAL
ALBUMIN UR-MCNC: NEGATIVE MG/DL
AMORPH CRY #/AREA URNS HPF: ABNORMAL /HPF
APPEARANCE UR: ABNORMAL
BACTERIA #/AREA URNS HPF: ABNORMAL /HPF
BILIRUB UR QL STRIP: NEGATIVE
COLOR UR AUTO: ABNORMAL
ERYTHROCYTE [DISTWIDTH] IN BLOOD BY AUTOMATED COUNT: 11.8 % (ref 10–15)
GLUCOSE UR STRIP-MCNC: NEGATIVE MG/DL
HCG SERPL-ACNC: ABNORMAL MLU/ML (ref 0–4)
HCT VFR BLD AUTO: 38.2 % (ref 35–47)
HGB BLD-MCNC: 13.1 G/DL (ref 11.7–15.7)
HGB UR QL STRIP: NEGATIVE
KETONES UR STRIP-MCNC: NEGATIVE MG/DL
LEUKOCYTE ESTERASE UR QL STRIP: ABNORMAL
MCH RBC QN AUTO: 30.1 PG (ref 26.5–33)
MCHC RBC AUTO-ENTMCNC: 34.3 G/DL (ref 31.5–36.5)
MCV RBC AUTO: 88 FL (ref 78–100)
MUCOUS THREADS #/AREA URNS LPF: PRESENT /LPF
NITRATE UR QL: NEGATIVE
PH UR STRIP: 7 [PH] (ref 5–7)
PLATELET # BLD AUTO: 283 10E3/UL (ref 150–450)
RBC # BLD AUTO: 4.35 10E6/UL (ref 3.8–5.2)
RBC URINE: 2 /HPF
SP GR UR STRIP: 1.01 (ref 1–1.03)
SPECIMEN EXPIRATION DATE: NORMAL
SQUAMOUS EPITHELIAL: 2 /HPF
UROBILINOGEN UR STRIP-MCNC: <2 MG/DL
WBC # BLD AUTO: 6 10E3/UL (ref 4–11)
WBC URINE: 7 /HPF

## 2022-02-26 PROCEDURE — 85041 AUTOMATED RBC COUNT: CPT | Performed by: EMERGENCY MEDICINE

## 2022-02-26 PROCEDURE — 81001 URINALYSIS AUTO W/SCOPE: CPT | Performed by: EMERGENCY MEDICINE

## 2022-02-26 PROCEDURE — 87086 URINE CULTURE/COLONY COUNT: CPT | Performed by: EMERGENCY MEDICINE

## 2022-02-26 PROCEDURE — 96372 THER/PROPH/DIAG INJ SC/IM: CPT | Performed by: EMERGENCY MEDICINE

## 2022-02-26 PROCEDURE — 36415 COLL VENOUS BLD VENIPUNCTURE: CPT | Performed by: EMERGENCY MEDICINE

## 2022-02-26 PROCEDURE — 84702 CHORIONIC GONADOTROPIN TEST: CPT | Performed by: EMERGENCY MEDICINE

## 2022-02-26 PROCEDURE — 76801 OB US < 14 WKS SINGLE FETUS: CPT

## 2022-02-26 PROCEDURE — 99285 EMERGENCY DEPT VISIT HI MDM: CPT | Mod: 25

## 2022-02-26 PROCEDURE — 250N000011 HC RX IP 250 OP 636: Performed by: EMERGENCY MEDICINE

## 2022-02-26 PROCEDURE — 86901 BLOOD TYPING SEROLOGIC RH(D): CPT | Performed by: EMERGENCY MEDICINE

## 2022-02-26 RX ADMIN — HUMAN RHO(D) IMMUNE GLOBULIN 300 MCG: 1500 SOLUTION INTRAMUSCULAR; INTRAVENOUS at 20:53

## 2022-02-26 ASSESSMENT — ENCOUNTER SYMPTOMS
FEVER: 0
ABDOMINAL PAIN: 1
COUGH: 0
CHILLS: 0

## 2022-02-26 NOTE — ED TRIAGE NOTES
Pt presents to the ED with c/o vaginal spotting since this morning. Pt roughly 10 weeks pregnant. Pt noticed minimal light brown/pink spotting this morning. Pt has had slight intermittent cramping for the entire pregnancy.

## 2022-02-27 NOTE — DISCHARGE INSTRUCTIONS
Your beta HCG today is 59,218     Baby looks great at 10 weeks 1 day with a heart rate of 165 bpm    There is a small subchorionic hemorrhage seen on ultrasound that will need follow-up with your OB provider and is the likely etiology of your vaginal spotting.

## 2022-02-27 NOTE — ED NOTES
AVS reviewed with pt. Pt to follow up with OBgyn. Pt instructed on reasons to come back. Pt stated understanding. Pt left in stable condition.     No noted reaction from rho(D) immune globulin noted.

## 2022-02-27 NOTE — ED PROVIDER NOTES
EMERGENCY DEPARTMENT ENCOUNTER      NAME: Darcy Pan  AGE: 26 year old female  YOB: 1995  MRN: [id number]  EVALUATION DATE & TIME: 2022  5:50 PM    PCP: [facility]    ED PROVIDER: Leonie Curry MD      Chief Complaint   Patient presents with     Vaginal Bleeding         FINAL IMPRESSION:  1. Subchorionic hemorrhage of placenta in first trimester, single or unspecified fetus          ED COURSE & MEDICAL DECISION MAKING:    Pertinent Labs & Imaging studies reviewed. (See chart for details)  26 year old female presents to the Emergency Department for evaluation of vaginal bleeding. Patient is a  currently 10 weeks pregnant presenting with vaginal bleeding.  She has noticed some vaginal spotting since this morning.  She denies any significant pain that is new.  Pelvic ultrasound was done demonstrating a small subchorionic hemorrhage which is the likely etiology of her bleeding.  Patient is B-, she was given RhoGam.  Other vital signs and laboratory studies are stable.  Patient will be discharged home with recommendations to follow-up with her OB, pelvic rest and continued activity.  She is comfortable with this plan.  I discussed with her reasons to return to the emergency department including vaginal bleeding, soaking a pad an hour, feeling lightheaded and dizzy, development of worsening  abdominal pain.    6:18 PM I met with the patient for the initial interview and physical examination. Discussed plan for treatment and workup in the ED.    8:33 PM I discussed the plan for discharge with the patient, and patient is agreeable. We discussed supportive cares at home and reasons for return to the ER including new or worsening symptoms - all questions and concerns addressed. Patient to be discharged by RN.       At the conclusion of the encounter I discussed the results of all of the tests and the disposition. The questions were answered. The patient or family acknowledged  understanding and was agreeable with the care plan.     PPE: Provider wore gloves, N95 mask, eye protection.       MEDICATIONS GIVEN IN THE EMERGENCY:  Medications   rho(D) immune globulin (RHOPHYLAC) injection 300 mcg (has no administration in time range)     Or   rho(D) immune globulin (RHOPHYLAC) injection 300 mcg (has no administration in time range)       NEW PRESCRIPTIONS STARTED AT TODAY'S ER VISIT  New Prescriptions    No medications on file          =================================================================    HPI    Patient information was obtained from: patient     Use of : N/A         Darcy Pan is a 26 year old female with a pertinent history of morning sickness with pregnancy who presents to this ED via walk-in for evaluation of vaginal bleeding.     Patient who is 10 weeks and 1 day pregnant reports onset of vaginal bleeding this morning. She woke up and noted a small amount of blood on her sheets, as well as on the toilet paper after wiping. She did not notice any blood in her urine. The blood is pink and mucousy with occasional bright red spots. She reports a history of a last cyst to her right ovary with associated abdominal cramps that are currently unchanged. She had another cyst that burst at 5 weeks and notes that the cyst remaining has been growing since the beginning of her pregnancy. She has an appointment on 3/7 to discuss surgical removal of her ovarian cyst. She had a headache last week which she took aspirin for. She has been taking unisom due to feeling unwell with her pregnancy. She notes that she was exposed to COVID-19 ~1 week ago but has tested negative since then and is vaccinated against COVID-19. Her blood type is B- and she has never taken rhogam. Denies any fevers, chills, cough, congestion, acute abdominal pain, and any other symptoms or complaints at this time.        REVIEW OF SYSTEMS   Review of Systems   Constitutional: Negative for chills and  fever.   HENT: Negative for congestion.    Respiratory: Negative for cough.    Gastrointestinal: Positive for abdominal pain (not acute).   Genitourinary: Positive for vaginal bleeding (spotting).   All other systems reviewed and are negative.      PAST MEDICAL HISTORY:  Past Medical History:   Diagnosis Date     Uncomplicated asthma        PAST SURGICAL HISTORY:  Past Surgical History:   Procedure Laterality Date     ENT SURGERY      tonsillectomy and adenoidectomy           CURRENT MEDICATIONS:    SUMAtriptan (IMITREX) 50 MG tablet        ALLERGIES:  Allergies   Allergen Reactions     Amoxicillin      Erythromycin      Polymyxin B-Trimethoprim Angioedema and Hives     Eye swelling        FAMILY HISTORY:  No family history on file.    SOCIAL HISTORY:   Social History     Socioeconomic History     Marital status: Single     Spouse name: Not on file     Number of children: Not on file     Years of education: Not on file     Highest education level: Not on file   Occupational History     Not on file   Tobacco Use     Smoking status: Never Smoker     Smokeless tobacco: Never Used   Substance and Sexual Activity     Alcohol use: Yes     Alcohol/week: 1.0 standard drink     Types: 1 Shots of liquor per week     Comment: 2-3 per week      Drug use: Never     Sexual activity: Yes     Partners: Male     Birth control/protection: Inserts/Ring   Other Topics Concern     Not on file   Social History Narrative     Not on file     Social Determinants of Health     Financial Resource Strain: Not on file   Food Insecurity: Not on file   Transportation Needs: Not on file   Physical Activity: Not on file   Stress: Not on file   Social Connections: Not on file   Intimate Partner Violence: Not on file   Housing Stability: Not on file       VITALS:  /70   Pulse 82   Temp 98.7  F (37.1  C) (Oral)   Resp 18   Wt 59 kg (130 lb)   SpO2 100%   BMI 23.03 kg/m      PHYSICAL EXAM    Constitutional: Well developed, Well nourished,  NAD. Gravid consistent with dates.   HENT: Normocephalic, Atraumatic, Bilateral external ears normal, Oropharynx normal, mucous membranes moist, Nose normal.   Neck- Normal range of motion, No tenderness, Supple, No stridor.  Eyes: PERRL, EOMI, Conjunctiva normal, No discharge.   Respiratory: Normal breath sounds, No respiratory distress  Cardiovascular: Normal heart rate, Regular rhythm  GI: Bowel sounds normal, Soft, No tenderness,   Musculoskeletal: No edema. Good range of motion in all major joints. No tenderness to palpation or major deformities noted.   Integument: Warm, Dry, No erythema, No rash  Neurologic: Alert & oriented x 3, Normal motor function, Normal sensory function, No focal deficits noted. Normal gait.   Psychiatric: Affect normal, Judgment normal, Mood normal.      LAB:  All pertinent labs reviewed and interpreted.  Results for orders placed or performed during the hospital encounter of 02/26/22   OB  US 1st trimester w transvag    Impression    IMPRESSION:   1.  Single living intrauterine gestation at 10 weeks 1 day, EDC 9/24/2022.  2.  Small subchorionic hemorrhage.       CBC (+ platelets, no diff)   Result Value Ref Range    WBC Count 6.0 4.0 - 11.0 10e3/uL    RBC Count 4.35 3.80 - 5.20 10e6/uL    Hemoglobin 13.1 11.7 - 15.7 g/dL    Hematocrit 38.2 35.0 - 47.0 %    MCV 88 78 - 100 fL    MCH 30.1 26.5 - 33.0 pg    MCHC 34.3 31.5 - 36.5 g/dL    RDW 11.8 10.0 - 15.0 %    Platelet Count 283 150 - 450 10e3/uL   HCG quantitative pregnancy (blood)   Result Value Ref Range    hCG Quantitative 59,218 (H) 0 - 4 mlU/mL   UA with Microscopic reflex to Culture    Specimen: Urine, Midstream   Result Value Ref Range    Color Urine Light Yellow Colorless, Straw, Light Yellow, Yellow    Appearance Urine Turbid (A) Clear    Glucose Urine Negative Negative mg/dL    Bilirubin Urine Negative Negative    Ketones Urine Negative Negative mg/dL    Specific Gravity Urine 1.009 1.001 - 1.030    Blood Urine Negative  Negative    pH Urine 7.0 5.0 - 7.0    Protein Albumin Urine Negative Negative mg/dL    Urobilinogen Urine <2.0 <2.0 mg/dL    Nitrite Urine Negative Negative    Leukocyte Esterase Urine 250 Danielle/uL (A) Negative    Bacteria Urine Few (A) None Seen /HPF    Mucus Urine Present (A) None Seen /LPF    Amorphous Crystals Urine Few (A) None Seen /HPF    RBC Urine 2 <=2 /HPF    WBC Urine 7 (H) <=5 /HPF    Squamous Epithelials Urine 2 (H) <=1 /HPF   RH Immune Globulin Screen   Result Value Ref Range    ABO/RH(D) B NEG     SPECIMEN EXPIRATION DATE [id number]        RADIOLOGY:  Reviewed all pertinent imaging. Please see official radiology report.  OB  US 1st trimester w transvag   Final Result   IMPRESSION:    1.  Single living intrauterine gestation at 10 weeks 1 day, EDC 9/24/2022.   2.  Small subchorionic hemorrhage.                EKG:    None     PROCEDURES:   None       I, Halie Marie, am serving as a scribe to document services personally performed by Leonie Curry, based on my observation and the provider's statements to me. I, Leonie Curry MD, attest that Halie Marie is acting in a scribe capacity, has observed my performance of the services and has documented them in accordance with my direction.    Leonie Curry MD  Emergency Medicine  [facility] EMERGENCY ROOM  [address] [id number] [phone]      Leonie Curry MD  02/26/22 2141

## 2022-02-28 LAB — BACTERIA UR CULT: NO GROWTH

## 2022-03-07 LAB
HEPATITIS B SURFACE ANTIGEN (EXTERNAL): NONREACTIVE
HEPATITIS B SURFACE ANTIGEN (EXTERNAL): NONREACTIVE
HIV1+2 AB SERPL QL IA: NONREACTIVE
HIV1+2 AB SERPL QL IA: NONREACTIVE
RUBELLA ANTIBODY IGG (EXTERNAL): NORMAL
RUBELLA ANTIBODY IGG (EXTERNAL): NORMAL

## 2022-03-17 ENCOUNTER — TELEPHONE (OUTPATIENT)
Dept: OTOLARYNGOLOGY | Facility: CLINIC | Age: 27
End: 2022-03-17
Payer: COMMERCIAL

## 2022-03-17 NOTE — TELEPHONE ENCOUNTER
Pt needs a referral to our clinic and/or post covid clinic to be seen. Will need to talk to her PCP about the referral(s). Left detailed msg for the pt. Call us back if any questions.    Kasandra Chen LPN

## 2022-03-17 NOTE — TELEPHONE ENCOUNTER
[facility] Call Center    Phone Message    May a detailed message be left on voicemail: yes     Reason for Call: Other: Pt calling to schedule appointment, no referral, Pt is pregnant and prior to pregnancy Pt had Covid 19, for the past 6 weeks Pt is experiencing awful taste and smell and would like to schedule appointment to be seen by one of our providers, per Pt.  Please call Pt call to discuss.    Thank you.    Action Taken: Message routed to:  [facility] ([facility] ENT    Travel Screening: Not Applicable

## 2022-03-24 ENCOUNTER — TRANSCRIBE ORDERS (OUTPATIENT)
Dept: MATERNAL FETAL MEDICINE | Facility: CLINIC | Age: 27
End: 2022-03-24
Payer: COMMERCIAL

## 2022-03-24 DIAGNOSIS — O26.90 PREGNANCY RELATED CONDITION: Primary | ICD-10-CM

## 2022-03-29 DIAGNOSIS — O46.90 VAGINAL BLEEDING DURING PREGNANCY: Primary | ICD-10-CM

## 2022-03-30 ENCOUNTER — PRE VISIT (OUTPATIENT)
Dept: MATERNAL FETAL MEDICINE | Facility: HOSPITAL | Age: 27
End: 2022-03-30
Payer: COMMERCIAL

## 2022-04-04 ENCOUNTER — LAB (OUTPATIENT)
Dept: LAB | Facility: HOSPITAL | Age: 27
End: 2022-04-04
Attending: ADVANCED PRACTICE MIDWIFE
Payer: COMMERCIAL

## 2022-04-04 ENCOUNTER — ANCILLARY PROCEDURE (OUTPATIENT)
Dept: ULTRASOUND IMAGING | Facility: HOSPITAL | Age: 27
End: 2022-04-04
Attending: ADVANCED PRACTICE MIDWIFE
Payer: COMMERCIAL

## 2022-04-04 ENCOUNTER — OFFICE VISIT (OUTPATIENT)
Dept: MATERNAL FETAL MEDICINE | Facility: HOSPITAL | Age: 27
End: 2022-04-04
Attending: ADVANCED PRACTICE MIDWIFE
Payer: COMMERCIAL

## 2022-04-04 DIAGNOSIS — O36.1190 ABO ISOIMMUNIZATION AFFECTING PREGNANCY, ANTEPARTUM, SINGLE OR UNSPECIFIED FETUS: ICD-10-CM

## 2022-04-04 DIAGNOSIS — O46.90 VAGINAL BLEEDING DURING PREGNANCY: ICD-10-CM

## 2022-04-04 DIAGNOSIS — O46.90 VAGINAL BLEEDING DURING PREGNANCY: Primary | ICD-10-CM

## 2022-04-04 LAB
ABO/RH(D): NORMAL
ANTIBODY SCREEN, TUBE: NORMAL
SPECIMEN EXPIRATION DATE: NORMAL
SPECIMEN EXPIRATION DATE: NORMAL

## 2022-04-04 PROCEDURE — 76805 OB US >/= 14 WKS SNGL FETUS: CPT | Mod: 26 | Performed by: OBSTETRICS & GYNECOLOGY

## 2022-04-04 PROCEDURE — 99202 OFFICE O/P NEW SF 15 MIN: CPT | Mod: 25 | Performed by: OBSTETRICS & GYNECOLOGY

## 2022-04-04 PROCEDURE — 86850 RBC ANTIBODY SCREEN: CPT

## 2022-04-04 PROCEDURE — 76805 OB US >/= 14 WKS SNGL FETUS: CPT

## 2022-04-04 PROCEDURE — 86901 BLOOD TYPING SEROLOGIC RH(D): CPT

## 2022-04-04 PROCEDURE — 36415 COLL VENOUS BLD VENIPUNCTURE: CPT

## 2022-04-04 NOTE — PROGRESS NOTES
"Please see \"Imaging\" tab under Chart Review for full details.    Opal Savage MD  Maternal Fetal Medicine    "

## 2022-04-16 ENCOUNTER — HEALTH MAINTENANCE LETTER (OUTPATIENT)
Age: 27
End: 2022-04-16

## 2022-04-25 ENCOUNTER — OFFICE VISIT (OUTPATIENT)
Dept: MATERNAL FETAL MEDICINE | Facility: HOSPITAL | Age: 27
End: 2022-04-25
Attending: OBSTETRICS & GYNECOLOGY
Payer: COMMERCIAL

## 2022-04-25 ENCOUNTER — ANCILLARY PROCEDURE (OUTPATIENT)
Dept: ULTRASOUND IMAGING | Facility: HOSPITAL | Age: 27
End: 2022-04-25
Attending: OBSTETRICS & GYNECOLOGY
Payer: COMMERCIAL

## 2022-04-25 DIAGNOSIS — O46.90 VAGINAL BLEEDING DURING PREGNANCY: ICD-10-CM

## 2022-04-25 DIAGNOSIS — O26.872 SHORT CERVIX IN SECOND TRIMESTER, ANTEPARTUM: Primary | ICD-10-CM

## 2022-04-25 PROCEDURE — 76811 OB US DETAILED SNGL FETUS: CPT | Mod: 26 | Performed by: OBSTETRICS & GYNECOLOGY

## 2022-04-25 PROCEDURE — 76811 OB US DETAILED SNGL FETUS: CPT

## 2022-04-25 PROCEDURE — 99207 PR NO CHARGE LOS: CPT | Performed by: OBSTETRICS & GYNECOLOGY

## 2022-04-25 PROCEDURE — 76817 TRANSVAGINAL US OBSTETRIC: CPT | Mod: 26 | Performed by: OBSTETRICS & GYNECOLOGY

## 2022-04-25 NOTE — PROGRESS NOTES
"Please see \"Imaging\" tab under \"Chart Review\" for details of today's visit.    Henna Noyola    "

## 2022-05-02 ENCOUNTER — ANCILLARY PROCEDURE (OUTPATIENT)
Dept: ULTRASOUND IMAGING | Facility: HOSPITAL | Age: 27
End: 2022-05-02
Attending: OBSTETRICS & GYNECOLOGY
Payer: COMMERCIAL

## 2022-05-02 ENCOUNTER — OFFICE VISIT (OUTPATIENT)
Dept: MATERNAL FETAL MEDICINE | Facility: HOSPITAL | Age: 27
End: 2022-05-02
Attending: OBSTETRICS & GYNECOLOGY
Payer: COMMERCIAL

## 2022-05-02 DIAGNOSIS — O26.872 SHORT CERVIX IN SECOND TRIMESTER, ANTEPARTUM: Primary | ICD-10-CM

## 2022-05-02 DIAGNOSIS — O26.872 SHORT CERVIX IN SECOND TRIMESTER, ANTEPARTUM: ICD-10-CM

## 2022-05-02 PROCEDURE — 76817 TRANSVAGINAL US OBSTETRIC: CPT

## 2022-05-02 PROCEDURE — 99207 PR NO CHARGE LOS: CPT | Performed by: OBSTETRICS & GYNECOLOGY

## 2022-05-02 PROCEDURE — 76817 TRANSVAGINAL US OBSTETRIC: CPT | Mod: 26 | Performed by: OBSTETRICS & GYNECOLOGY

## 2022-05-02 NOTE — PROGRESS NOTES
Darcy Pan was seen for an ultrasound today at the Maternal-Fetal Medicine center.      For the details of the ultrasound please see the report which can be found under the imaging tab.      Shelly Polanco MD  , OB/GYN  Maternal-Fetal Medicine  [email or web address]  [phone] (Main MFM Office)  [phone] or [phone] (for 24 hour MFM questions)  [phone] (Pager)

## 2022-05-04 ENCOUNTER — APPOINTMENT (OUTPATIENT)
Dept: ULTRASOUND IMAGING | Facility: CLINIC | Age: 27
End: 2022-05-04
Attending: REGISTERED NURSE
Payer: COMMERCIAL

## 2022-05-04 ENCOUNTER — HOSPITAL ENCOUNTER (OUTPATIENT)
Facility: CLINIC | Age: 27
Discharge: HOME | End: 2022-05-04
Attending: REGISTERED NURSE | Admitting: REGISTERED NURSE
Payer: COMMERCIAL

## 2022-05-04 ENCOUNTER — HOSPITAL ENCOUNTER (OUTPATIENT)
Facility: CLINIC | Age: 27
End: 2022-05-04
Admitting: REGISTERED NURSE
Payer: COMMERCIAL

## 2022-05-04 VITALS
TEMPERATURE: 97.7 F | HEART RATE: 78 BPM | RESPIRATION RATE: 16 BRPM | BODY MASS INDEX: 22.68 KG/M2 | SYSTOLIC BLOOD PRESSURE: 121 MMHG | HEIGHT: 63 IN | DIASTOLIC BLOOD PRESSURE: 78 MMHG | WEIGHT: 128 LBS

## 2022-05-04 LAB
ALBUMIN UR-MCNC: NEGATIVE MG/DL
APPEARANCE UR: CLEAR
BILIRUB UR QL STRIP: NEGATIVE
CLUE CELLS: ABNORMAL
COLOR UR AUTO: COLORLESS
GLUCOSE UR STRIP-MCNC: NEGATIVE MG/DL
HGB UR QL STRIP: NEGATIVE
KETONES UR STRIP-MCNC: NEGATIVE MG/DL
LEUKOCYTE ESTERASE UR QL STRIP: NEGATIVE
NITRATE UR QL: NEGATIVE
PH UR STRIP: 7 [PH] (ref 5–7)
SP GR UR STRIP: 1 (ref 1–1.03)
TRICHOMONAS, WET PREP: ABNORMAL
UROBILINOGEN UR STRIP-MCNC: <2 MG/DL
WBC'S/HIGH POWER FIELD, WET PREP: ABNORMAL
YEAST, WET PREP: ABNORMAL

## 2022-05-04 PROCEDURE — 87210 SMEAR WET MOUNT SALINE/INK: CPT | Performed by: REGISTERED NURSE

## 2022-05-04 PROCEDURE — 81003 URINALYSIS AUTO W/O SCOPE: CPT | Performed by: REGISTERED NURSE

## 2022-05-04 PROCEDURE — 76815 OB US LIMITED FETUS(S): CPT

## 2022-05-04 PROCEDURE — G0463 HOSPITAL OUTPT CLINIC VISIT: HCPCS

## 2022-05-04 RX ORDER — PRENATAL VIT/IRON FUM/FOLIC AC 27MG-0.8MG
1 TABLET ORAL DAILY
COMMUNITY

## 2022-05-04 RX ORDER — LIDOCAINE 40 MG/G
CREAM TOPICAL
Status: DISCONTINUED | OUTPATIENT
Start: 2022-05-04 | End: 2022-05-04 | Stop reason: HOSPADM

## 2022-05-04 NOTE — PROGRESS NOTES
"Outpatient/Triage Note:    Patient Name:  Darcy Pan  :      1995  MRN:      [id number]      Assessment:   @ 19w4d here for evaluation of cramping/spotting since yesterday evening. No recent intercourse or abdominal trauma. Feeling general pelvic pressure.     Plan:   -US today shows cervix 3cm with no funneling. No previa. Previously cervix measuring 2.6cm in clinic. Reviewed results with Dr. Seals.   -Wet prep/UA collected. Will call with results and send treatment as needed.   -Follow-up in clinic with OB provider as scheduled or sooner as needed. All questions answered. Agrees with plan.     Subjective:  Darcy Pan is a 26 year old  at 19 weeks, who presented to [facility] for evaluation of cramping/spotting. Denies leaking of fluid, bright red bleeding, or changes in fetal movement.     Objective:  Vital signs: /78 (BP Location: Right arm, Patient Position: Sitting, Cuff Size: Adult Regular)   Pulse 78   Temp 97.7  F (36.5  C) (Oral)   Resp 16   Ht 1.6 m (5' 3\")   Wt 58.1 kg (128 lb)   LMP 2021   BMI 22.67 kg/m    FHR: 140    Physical Exam: A&Ox3  Abdomen: SIUP, abdomen non-tender  SVE: Deferred  Legs: No edema, moves freely      Results:  Wet prep/UA pending    Provider: Evelyn Damon CNM     "

## 2022-05-04 NOTE — PROGRESS NOTES
Pt arrived to [facility] for follow up US for cervical length.  19.4wks. Bneg blood type. Pt brought to room . FHT's via doppler 145, lots of FM noted. VSS. Uterus soft, non tender. Pt denies ctx's, feeling pressure, cramping and some spotting noted. Provider aware.

## 2022-05-11 ENCOUNTER — ANCILLARY PROCEDURE (OUTPATIENT)
Dept: ULTRASOUND IMAGING | Facility: HOSPITAL | Age: 27
End: 2022-05-11
Attending: OBSTETRICS & GYNECOLOGY
Payer: COMMERCIAL

## 2022-05-11 ENCOUNTER — OFFICE VISIT (OUTPATIENT)
Dept: MATERNAL FETAL MEDICINE | Facility: HOSPITAL | Age: 27
End: 2022-05-11
Attending: OBSTETRICS & GYNECOLOGY
Payer: COMMERCIAL

## 2022-05-11 DIAGNOSIS — O26.872 SHORT CERVIX IN SECOND TRIMESTER, ANTEPARTUM: Primary | ICD-10-CM

## 2022-05-11 DIAGNOSIS — O26.872 SHORT CERVIX IN SECOND TRIMESTER, ANTEPARTUM: ICD-10-CM

## 2022-05-11 PROCEDURE — 76817 TRANSVAGINAL US OBSTETRIC: CPT

## 2022-05-11 PROCEDURE — 76817 TRANSVAGINAL US OBSTETRIC: CPT | Mod: 26 | Performed by: OBSTETRICS & GYNECOLOGY

## 2022-05-11 PROCEDURE — 99207 PR NO CHARGE LOS: CPT | Performed by: OBSTETRICS & GYNECOLOGY

## 2022-05-18 ENCOUNTER — HOSPITAL ENCOUNTER (OUTPATIENT)
Facility: HOSPITAL | Age: 27
Discharge: HOME | End: 2022-05-18
Attending: REGISTERED NURSE | Admitting: REGISTERED NURSE
Payer: COMMERCIAL

## 2022-05-18 VITALS
TEMPERATURE: 98.9 F | WEIGHT: 129 LBS | RESPIRATION RATE: 16 BRPM | HEIGHT: 63 IN | SYSTOLIC BLOOD PRESSURE: 114 MMHG | DIASTOLIC BLOOD PRESSURE: 69 MMHG | BODY MASS INDEX: 22.86 KG/M2

## 2022-05-18 DIAGNOSIS — O23.43 URINARY TRACT INFECTION IN MOTHER DURING THIRD TRIMESTER OF PREGNANCY: Primary | ICD-10-CM

## 2022-05-18 LAB
ALBUMIN UR-MCNC: NEGATIVE MG/DL
APPEARANCE UR: CLEAR
BACTERIA #/AREA URNS HPF: ABNORMAL /HPF
BILIRUB UR QL STRIP: NEGATIVE
CLUE CELLS: ABNORMAL
COLOR UR AUTO: COLORLESS
GLUCOSE UR STRIP-MCNC: NEGATIVE MG/DL
HGB UR QL STRIP: ABNORMAL
KETONES UR STRIP-MCNC: NEGATIVE MG/DL
LEUKOCYTE ESTERASE UR QL STRIP: ABNORMAL
NITRATE UR QL: NEGATIVE
PH UR STRIP: 7 [PH] (ref 5–7)
RBC URINE: 3 /HPF
SP GR UR STRIP: 1 (ref 1–1.03)
SQUAMOUS EPITHELIAL: 5 /HPF
TRICHOMONAS, WET PREP: ABNORMAL
UROBILINOGEN UR STRIP-MCNC: <2 MG/DL
WBC URINE: 21 /HPF
WBC'S/HIGH POWER FIELD, WET PREP: ABNORMAL
YEAST, WET PREP: ABNORMAL

## 2022-05-18 PROCEDURE — 87210 SMEAR WET MOUNT SALINE/INK: CPT | Performed by: REGISTERED NURSE

## 2022-05-18 PROCEDURE — 87086 URINE CULTURE/COLONY COUNT: CPT | Performed by: REGISTERED NURSE

## 2022-05-18 PROCEDURE — G0463 HOSPITAL OUTPT CLINIC VISIT: HCPCS

## 2022-05-18 PROCEDURE — 81001 URINALYSIS AUTO W/SCOPE: CPT | Performed by: REGISTERED NURSE

## 2022-05-18 RX ORDER — LIDOCAINE 40 MG/G
CREAM TOPICAL
Status: DISCONTINUED | OUTPATIENT
Start: 2022-05-18 | End: 2022-05-18 | Stop reason: HOSPADM

## 2022-05-18 RX ORDER — CEPHALEXIN 500 MG/1
500 CAPSULE ORAL EVERY 6 HOURS
Qty: 28 CAPSULE | Refills: 0 | Status: SHIPPED | OUTPATIENT
Start: 2022-05-18 | End: 2022-05-25

## 2022-05-18 RX ORDER — CHLORAL HYDRATE 500 MG
2 CAPSULE ORAL DAILY
Status: ON HOLD | COMMUNITY
End: 2024-04-17

## 2022-05-18 NOTE — DISCHARGE INSTRUCTIONS
Discharge Instruction for Undelivered Patients      You were seen for:  Abdominal Cramping while pregnant  We Consulted: Evelyn Damon CNM  You had (Test or Medicine):Urinalysis, and Wet prep vaginal swab     Diet:   Drink 8 to 12 glasses of liquids (milk, juice, water) every day.  You may eat meals and snacks.     Activity:  Call your doctor or nurse midwife if your baby is moving less than usual.     Call your provider if you notice:  Signs of bladder infection: pain when you urinate (use the toilet), need to go more often and more urgently.  The bag of ramírez (rupture of membranes) breaks, or you notice leaking in your underwear.  Bright red blood in your underwear.  Abdominal (lower belly) or stomach pain.  Contractions (tightening) less than 5 minutes apart for one hour or more.  *If less than 34 weeks: Contractions (tightening) more than 6 times in one hour.  Increase or change in vaginal discharge (note the color and amount)    Follow-up:  Make an appointment to be seen on nest week

## 2022-05-18 NOTE — PROGRESS NOTES
Data: [facility] at 1253.   Reason for maternal/fetal assessment per patient is abdominal cramping with bleeding on . Patient is a . Prenatal record reviewed.      OB History    Para Term  AB Living   1 0 0 0 0 0   SAB IAB Ectopic Multiple Live Births   0 0 0 0 0      # Outcome Date GA Lbr Jose Elias/2nd Weight Sex Delivery Anes PTL Lv   1 Current               Medical History:   Past Medical History:   Diagnosis Date     Uncomplicated asthma    . Gestational Age 21w4d. VSS. Cervix: not examined.  Fetal movement present per Carmella.Patient reports cramping, and discharge since getting pregnant. Bleeding when wiped last night 22. Patient denies backache, UTI symptoms, GI problems, edema, headache, visual disturbances, epigastric or URQ pain, abdominal pain, denies rupture of membranes.   Action: Verbal consent for doptone. Triage assessment completed.  Fetal assessment: Presumed adequate fetal oxygenation documented (see flow record). Patient education pamphlets given on fetal movement counts.. Patient instructed to report change in fetal movement, vaginal leaking of fluid or bleeding, abdominal pain, or any concerns related to the pregnancy to her nurse/physician.   Response: STEPHON Damon informed of patients triage admission. Plan per provider is UA/Wet prep. Resulted, STEPHON notified. CNM ordered antibiotics and discharge home. Patient verbalized understanding of education and verbalized agreement with plan. Will follow up at [facility] within the next few days. Discharged ambulatory at 1430.    Face to Face time:  40 minutes

## 2022-05-19 NOTE — PROGRESS NOTES
"Late entry secondary to alternate, emergent patient care.   Outpatient/Triage Note:    Patient Name:  Darcy Pan  :      1995  MRN:      [id number]      Assessment:   @ 21w5d here for evaluation of cramping.     Plan:   -Wet prep and UA collected. Positive for UTI. Will send rx for treatment.   -Previous history of shortened cervix. This remained resolved at  M appointment (3.17cm).   - Discharge to home undelivered. Reviewed warning signs including decreased fetal movement, leaking of fluid, vaginal bleeding, or signs of labor. Reviewed how to contact on-call provider. Follow-up in clinic with OB provider as scheduled or sooner as needed. All questions answered. Agrees with plan.     Subjective:  Darcy Pan is a 26 year old  at 21 weeks, who presented to [facility] for evaluation of cramping. Denies leaking of fluid, bleeding, or changes in fetal movement.     Objective:  Vital signs: /69   Temp 98.9  F (37.2  C) (Oral)   Resp 16   Ht 1.6 m (5' 2.99\")   Wt 58.5 kg (129 lb)   LMP 2021   BMI 22.86 kg/m    FHR: doptone 150  Uterine contractions: non palpable by RN    Physical Exam: A&Ox3 per RN  Abdomen: SIUP, abdomen non-tender, no CVA tenderness per RN  SVE: deferred  Legs: no edema, moves freely per RN      Results:  Recent Results (from the past 168 hour(s))   UA reflex to Microscopic and Culture    Specimen: Urine, Clean Catch   Result Value Ref Range Status    Color Urine Colorless Colorless, Straw, Light Yellow, Yellow Final    Appearance Urine Clear Clear Final    Glucose Urine Negative Negative mg/dL Final    Bilirubin Urine Negative Negative Final    Ketones Urine Negative Negative mg/dL Final    Specific Gravity Urine 1.005 1.001 - 1.030 Final    Blood Urine 0.03 mg/dL (A) Negative Final    pH Urine 7.0 5.0 - 7.0 Final    Protein Albumin Urine Negative Negative mg/dL Final    Urobilinogen Urine <2.0 <2.0 mg/dL Final    Nitrite Urine Negative " Negative Final    Leukocyte Esterase Urine 500 Danielle/uL (A) Negative Final    Bacteria Urine Few (A) None Seen /HPF Final    RBC Urine 3 (H) <=2 /HPF Final    WBC Urine 21 (H) <=5 /HPF Final    Squamous Epithelials Urine 5 (H) <=1 /HPF Final     *Note: Due to a large number of results and/or encounters for the requested time period, some results have not been displayed. A complete set of results can be found in Results Review.         Provider: Evelyn Damon CNM

## 2022-05-20 LAB — BACTERIA UR CULT: NO GROWTH

## 2022-06-01 ENCOUNTER — HOSPITAL ENCOUNTER (OUTPATIENT)
Facility: CLINIC | Age: 27
End: 2022-06-01
Admitting: OBSTETRICS & GYNECOLOGY
Payer: COMMERCIAL

## 2022-06-01 ENCOUNTER — HOSPITAL ENCOUNTER (OUTPATIENT)
Facility: CLINIC | Age: 27
Discharge: HOME | End: 2022-06-01
Attending: OBSTETRICS & GYNECOLOGY | Admitting: OBSTETRICS & GYNECOLOGY
Payer: COMMERCIAL

## 2022-06-01 VITALS — TEMPERATURE: 98 F | SYSTOLIC BLOOD PRESSURE: 126 MMHG | DIASTOLIC BLOOD PRESSURE: 83 MMHG | RESPIRATION RATE: 16 BRPM

## 2022-06-01 LAB
ABO/RH(D): ABNORMAL
ALBUMIN UR-MCNC: NEGATIVE MG/DL
ANTIBODY ID: NORMAL
ANTIBODY SCREEN: POSITIVE
APPEARANCE UR: CLEAR
BACTERIA #/AREA URNS HPF: ABNORMAL /HPF
BILIRUB UR QL STRIP: NEGATIVE
CLUE CELLS: ABNORMAL
COLOR UR AUTO: ABNORMAL
CRYSTALS AMN MICRO: NORMAL
GLUCOSE UR STRIP-MCNC: NEGATIVE MG/DL
HGB UR QL STRIP: NEGATIVE
KETONES UR STRIP-MCNC: NEGATIVE MG/DL
LEUKOCYTE ESTERASE UR QL STRIP: ABNORMAL
MUCOUS THREADS #/AREA URNS LPF: PRESENT /LPF
NITRATE UR QL: NEGATIVE
PH UR STRIP: 7.5 [PH] (ref 5–7)
RBC URINE: 2 /HPF
SP GR UR STRIP: 1 (ref 1–1.03)
SPECIMEN EXPIRATION DATE: ABNORMAL
SPECIMEN EXPIRATION DATE: NORMAL
SQUAMOUS EPITHELIAL: 1 /HPF
TRICHOMONAS, WET PREP: ABNORMAL
UROBILINOGEN UR STRIP-MCNC: NORMAL MG/DL
WBC URINE: 2 /HPF
WBC'S/HIGH POWER FIELD, WET PREP: ABNORMAL
YEAST, WET PREP: ABNORMAL

## 2022-06-01 PROCEDURE — 250N000011 HC RX IP 250 OP 636: Performed by: STUDENT IN AN ORGANIZED HEALTH CARE EDUCATION/TRAINING PROGRAM

## 2022-06-01 PROCEDURE — 87491 CHLMYD TRACH DNA AMP PROBE: CPT | Performed by: STUDENT IN AN ORGANIZED HEALTH CARE EDUCATION/TRAINING PROGRAM

## 2022-06-01 PROCEDURE — 36415 COLL VENOUS BLD VENIPUNCTURE: CPT | Performed by: STUDENT IN AN ORGANIZED HEALTH CARE EDUCATION/TRAINING PROGRAM

## 2022-06-01 PROCEDURE — G0463 HOSPITAL OUTPT CLINIC VISIT: HCPCS | Mod: 25

## 2022-06-01 PROCEDURE — 87210 SMEAR WET MOUNT SALINE/INK: CPT | Performed by: STUDENT IN AN ORGANIZED HEALTH CARE EDUCATION/TRAINING PROGRAM

## 2022-06-01 PROCEDURE — 81001 URINALYSIS AUTO W/SCOPE: CPT | Performed by: STUDENT IN AN ORGANIZED HEALTH CARE EDUCATION/TRAINING PROGRAM

## 2022-06-01 PROCEDURE — 86901 BLOOD TYPING SEROLOGIC RH(D): CPT | Performed by: STUDENT IN AN ORGANIZED HEALTH CARE EDUCATION/TRAINING PROGRAM

## 2022-06-01 PROCEDURE — 87653 STREP B DNA AMP PROBE: CPT | Performed by: STUDENT IN AN ORGANIZED HEALTH CARE EDUCATION/TRAINING PROGRAM

## 2022-06-01 PROCEDURE — 87591 N.GONORRHOEAE DNA AMP PROB: CPT | Performed by: STUDENT IN AN ORGANIZED HEALTH CARE EDUCATION/TRAINING PROGRAM

## 2022-06-01 PROCEDURE — 96372 THER/PROPH/DIAG INJ SC/IM: CPT | Performed by: STUDENT IN AN ORGANIZED HEALTH CARE EDUCATION/TRAINING PROGRAM

## 2022-06-01 PROCEDURE — 86870 RBC ANTIBODY IDENTIFICATION: CPT | Performed by: STUDENT IN AN ORGANIZED HEALTH CARE EDUCATION/TRAINING PROGRAM

## 2022-06-01 RX ORDER — PROGESTERONE 200 MG/1
200 CAPSULE ORAL DAILY
Status: ON HOLD | COMMUNITY
End: 2022-09-11

## 2022-06-01 RX ORDER — LIDOCAINE 40 MG/G
CREAM TOPICAL
Status: DISCONTINUED | OUTPATIENT
Start: 2022-06-01 | End: 2022-06-01 | Stop reason: HOSPADM

## 2022-06-01 RX ADMIN — HUMAN RHO(D) IMMUNE GLOBULIN 300 MCG: 1500 SOLUTION INTRAMUSCULAR; INTRAVENOUS at 11:59

## 2022-06-01 NOTE — PLAN OF CARE
Data: [facility] at 0845.   Reason for maternal/fetal assessment per patient is Rule out rupture of membranes  . Patient is a . Prenatal record reviewed.      OB History    Para Term  AB Living   1 0 0 0 0 0   SAB IAB Ectopic Multiple Live Births   0 0 0 0 0      # Outcome Date GA Lbr Jose Elias/2nd Weight Sex Delivery Anes PTL Lv   1 Current               Medical History:   Past Medical History:   Diagnosis Date     Uncomplicated asthma    . Gestational Age 23w4d. VSS. Cervix: closed.  Fetal movement present. Patient denies backache, UTI symptoms, GI problems, bloody show, vaginal bleeding, edema, headache, visual disturbances, epigastric or URQ pain, abdominal pain. Vaginal bleeding present following sterile speculum exam. RHOGAM given IM. Ferning negative. Normal CHIDI. Support person is present.  Action: Verbal consent for EFM. Triage assessment completed. Fetal assessment: Presumed adequate fetal oxygenation documented (see flow record). Patient instructed to report change in fetal movement, vaginal leaking of fluid or bleeding, abdominal pain, or any concerns related to the pregnancy to her nurse/physician.   Response: Dr. Chaudhry informed of patient arrival and status. Plan per provider is discharge to home following negative evaluation for PROM.Patient verbalized understanding of education and verbalized agreement with plan. Discharged ambulatory at 1250.

## 2022-06-01 NOTE — PROGRESS NOTES
[facility] Antepartum Triage Assessment  6/1/2022     Subjective:   Patient presents to triage for assessment of LOF.  She was initially referred to [facility] for level II comprehensive US for placenta previa.  The placenta previa has now resolved.  However she was noted to have a shortened cervix of 26.8 mm.  She underwent serially cervix length screening with length at last assessment on 5/11/22 of 31.7 mm.  She states since that time she has had an US at [facility] [place] revealing cervix 2.5 cm for which she was started on qHS vaginal progesterone 200 mg.  She has been using this for ~ the past week.  Yesterday she noted increased discharge.  This AM she woke up to a mild amount of cramping.  She states she has had intermittent cramping and bleeding throughout this pregnancy.  She did not have any bleeding today but did not an episode of leakage of clear fluid that stained her panty liner. She thinks she may have had a small amount of leakage after that time but has not had a continuous LOF.  She does have some pelvic pressure.  Active fetal movement.  Denies headache, vision changes, nausea, vomiting, RUQ pain, chest pain, shortness of breath.  No additional concerns.    Objective:  Vitals:    06/01/22 0914   BP: 126/83   Resp: 16   Temp: 98  F (36.7  C)   TempSrc: Oral     Gen: Resting comfortably in bed, NAD  Heart:  Regular rate and rhythm  Lungs:  Clear to auscultation bilateral fields  Abd: Gravid, non-tender, non-distended  Ext: non-tender, no edema  SSE: Cervix visually closed.  Very friable with bleeding noted with gentle manipulation of external cervix.  Bleeding does not appear to be coming from above cervix but rather external cervix. No pooling on prolonged observation. No fluid leakage from cervix with valsalva maneuver.    SVE: C/L/floating    SSE 2 hour recheck:  Cervix remains visually closed.  No pooling.  No active bleeding from cervix.  No pooling on valsalva.  No ferning.    FHT: Baseline 140, overall  moderate variability, no decelerations, 10x10 accel present. Overall appropriate for gestational age.  [place]: overall flat    Bedside US:  Breech, posterior placenta, active fetal movement. MVP 4.3 cm.    Assessment/Plan:   Darcy Pan is a 26 year old  @ 23w4d by LMP c/w 8 week US presents for assessment of LOF.  Also on vaginal progesterone for cervical length 25 mm on outside US last week (per patient, records not available for review).    LOF  Cervical length 25 mm   Patient appears comfortable, vitals WNL.  Has had cramping intermittently throughout pregnancy, denies worsening today.  Has not continued to leak fluid.  On initial SSE her external cervix appeared with prominent friable ectropion with bleeding upon gentle manipulation.  Thus ROM+ and FFN deferred due to risk of false positive. Cervix closed without pooling or fluid leakage from cervix with valsalva maneuver. SVE with a closed cervix. Bleeding resolved with pressure.  BSS with MVP 4.3, stable from prior.  Active fetal movement observed on BSS.  Ferning negative.  Wet prep 2+ WBC otherwise negative.  UA with small leukocyte esterase, few bacteria, and mucous.  Prior UA 22 with similar results and urine culture no growth at that time.  Denies urinary complaints thus repeat urine culture deferred.  Speculum exam repeated in 2 hours without any further LOF while in triage under observation.  Exam similar with closed cervix, negative ferning, no pooling.  We discussed overall low suspicion for PPROM at this time.  Suspect copious discharge related to vaginal progesterone.  However if leaking returns she should present for evaluation immediately.  Recommend continuing vaginal progesterone as prescribed by primary OB.  We discussed she is at risk for  labor given cervical shortening however cervix is closed today which is overall reassuring.    Vaginal bleeding  Carmella has been having spotting intermittently throughout  pregnancy.  Cervix with prominent ectropion and very friable on today's exam, thus I suspect at least at this time this is the etiology of her spotting.  She did not present with this concern today.  We will administer Rhogam today for bleeding given that it has been >12 weeks since last Rhogam administration (2/26/22) although suspicion bleeding is from external cervix.  4/4/22 antibody screen negative.  Thus will administer Rhogam today with ABO to be obtained prior to administration.   FOB reports he is Rh negative.     Plan:  - discharge home with strict return precautions, encourage to return if any concern  - follow up gonorrhea/chlamydia  - follow up GBS    Dispo:  Discharge home.  Strict return precautions.  Follow up with primary OB provider as previously scheduled.    Leona Mueller, MS4    I was present with the student who participated in the service and in the documentation of the   note. I have verified the history and personally performed the physical exam and medical decision making. I agree with the assessment and plan of care as documented in the note.    Staffed with Dr. Shelly Chaudhry MD   Maternal-Fetal Medicine Fellow, PGY5  6/1/2022 3:12 PM

## 2022-06-01 NOTE — PLAN OF CARE
Pt arrived for evaluation of membrane status. Reports she has been monitored for short cervix/cramping and started vaginal progesterone on Thursday. Yesterday she noticed increased vaginal discharge that she thought was from the vaginal medication. However, this AM she noticed a gush of clear fluid 2 separate times. EFM/toco applied. Triage assessment completed. No vaginal bleeding noted in last several weeks.     MD Chaudhry notified of pt arrival and to bedside to evaluate. SSE performed, and cervix closed by SVE. Labs sent. Continue to monitor closely.

## 2022-06-01 NOTE — DISCHARGE INSTRUCTIONS
Discharge Instruction for Undelivered Patients      You were seen for: Membrane Assessment  We Consulted: Maternal Fetal Medicine Team (Dr Chaudhry)  You had (Test or Medicine):Fetal monitoring, speculum exam, tests for infection, ferning test for rupture of membranes     Diet:   Drink 8 to 12 glasses of liquids (milk, juice, water) every day.  You may eat meals and snacks.     Activity:  Call your doctor or nurse midwife if your baby is moving less than usual.     Call your provider if you notice:  Swelling in your face or increased swelling in your hands or legs.  Headaches that are not relieved by Tylenol (acetaminophen).  Changes in your vision (blurring: seeing spots or stars.)  Nausea (sick to your stomach) and vomiting (throwing up).   Weight gain of 5 pounds or more per week.  Heartburn that doesn't go away.  Signs of bladder infection: pain when you urinate (use the toilet), need to go more often and more urgently.  The bag of ramírez (rupture of membranes) breaks, or you notice leaking in your underwear.  Bright red blood in your underwear.  Abdominal (lower belly) or stomach pain.  *If less than 34 weeks: Contractions (tightening) more than 6 times in one hour.  Increase or change in vaginal discharge (note the color and amount)    Follow-up:  As scheduled in the clinic

## 2022-06-02 LAB
C TRACH DNA SPEC QL PROBE+SIG AMP: NEGATIVE
GP B STREP DNA SPEC QL NAA+PROBE: NEGATIVE
N GONORRHOEA DNA SPEC QL NAA+PROBE: NEGATIVE

## 2022-07-07 LAB — VDRL (SYPHILIS) (EXTERNAL): NONREACTIVE

## 2022-08-30 ENCOUNTER — OFFICE VISIT (OUTPATIENT)
Dept: FAMILY MEDICINE | Facility: CLINIC | Age: 27
End: 2022-08-30
Payer: COMMERCIAL

## 2022-08-30 VITALS
DIASTOLIC BLOOD PRESSURE: 66 MMHG | OXYGEN SATURATION: 100 % | HEART RATE: 96 BPM | WEIGHT: 137 LBS | SYSTOLIC BLOOD PRESSURE: 100 MMHG | BODY MASS INDEX: 23.39 KG/M2 | HEIGHT: 64 IN

## 2022-08-30 DIAGNOSIS — Z3A.36 36 WEEKS GESTATION OF PREGNANCY: ICD-10-CM

## 2022-08-30 DIAGNOSIS — Z76.89 ENCOUNTER TO ESTABLISH CARE: Primary | ICD-10-CM

## 2022-08-30 PROBLEM — Z30.44 ENCOUNTER FOR SURVEILLANCE OF VAGINAL RING HORMONAL CONTRACEPTIVE DEVICE: Status: RESOLVED | Noted: 2020-06-29 | Resolved: 2022-08-30

## 2022-08-30 PROCEDURE — 99213 OFFICE O/P EST LOW 20 MIN: CPT | Performed by: NURSE PRACTITIONER

## 2022-08-30 RX ORDER — LACTOBACILLUS RHAMNOSUS GG 10B CELL
1 CAPSULE ORAL 2 TIMES DAILY
Status: ON HOLD | COMMUNITY
End: 2024-04-17

## 2022-08-30 ASSESSMENT — ENCOUNTER SYMPTOMS
MYALGIAS: 0
SHORTNESS OF BREATH: 0
CHILLS: 0
NERVOUS/ANXIOUS: 0
CONSTIPATION: 0
EYE PAIN: 0
ABDOMINAL PAIN: 0
ARTHRALGIAS: 0
COUGH: 0
FEVER: 0
JOINT SWELLING: 0
PARESTHESIAS: 0
SORE THROAT: 0
FREQUENCY: 0
HEARTBURN: 0
HEMATURIA: 0
PALPITATIONS: 0
DYSURIA: 0
DIZZINESS: 0
BREAST MASS: 0
WEAKNESS: 0
NAUSEA: 0
HEMATOCHEZIA: 0
HEADACHES: 0
DIARRHEA: 0

## 2022-08-30 NOTE — PROGRESS NOTES
"  Assessment & Plan     Encounter to establish care    36 weeks gestation of pregnancy        Return in about 6 months (around 2/28/2023) for Routine preventive, with me, in person.    Rosalinda Leach NP  [facility]    Latisha Weir is a 26 year old who presents to establish care.  Patient was originally scheduled for a physical, but we will plan on doing this next year as she is 36 weeks pregnant.  Patient is pregnant with her first child, a boy.  Her pregnancy has been high risk due to a shortened cervix.  She recently got off intravaginal progesterone.  Following with [facility] for her pregnancy.  No history of gestational diabetes or hypertension thus far.  Patient is .  Works as a healthcare consultant.    History of exercise-induced asthma as a child.  She really has not had any problems with this as an adult.  Denies any chronic cough, wheezing, or shortness of breath.    Patient reports a bump on her low back that has been there for some time.  Her  has noted it, as he has been giving her exercises.  It is not tender or red.  She denies any radicular symptoms.    Review of Systems   Pertinent items in HPI      Objective    /66 (BP Location: Right arm, Patient Position: Sitting, Cuff Size: Adult Regular)   Pulse 96   Ht 1.626 m (5' 4\")   Wt 62.1 kg (137 lb)   LMP 12/18/2021   SpO2 100%   BMI 23.52 kg/m    Body mass index is 23.52 kg/m .  Physical Exam   GENERAL: healthy, alert and no distress  BACK: no specific mass palpated. No lumbar spinal or paraspinal tenderness.       "

## 2022-09-11 ENCOUNTER — HOSPITAL ENCOUNTER (OUTPATIENT)
Facility: CLINIC | Age: 27
Discharge: HOME | End: 2022-09-11
Attending: ADVANCED PRACTICE MIDWIFE | Admitting: ADVANCED PRACTICE MIDWIFE
Payer: COMMERCIAL

## 2022-09-11 LAB
CLUE CELLS: ABNORMAL
RUPTURE OF FETAL MEMBRANES BY ROM PLUS: NEGATIVE
TRICHOMONAS, WET PREP: ABNORMAL
WBC'S/HIGH POWER FIELD, WET PREP: ABNORMAL
YEAST, WET PREP: ABNORMAL

## 2022-09-11 PROCEDURE — G0463 HOSPITAL OUTPT CLINIC VISIT: HCPCS

## 2022-09-11 PROCEDURE — 87210 SMEAR WET MOUNT SALINE/INK: CPT | Performed by: ADVANCED PRACTICE MIDWIFE

## 2022-09-11 PROCEDURE — 84112 EVAL AMNIOTIC FLUID PROTEIN: CPT | Performed by: ADVANCED PRACTICE MIDWIFE

## 2022-09-11 RX ORDER — LIDOCAINE 40 MG/G
CREAM TOPICAL
Status: DISCONTINUED | OUTPATIENT
Start: 2022-09-11 | End: 2022-09-11 | Stop reason: HOSPADM

## 2022-09-11 ASSESSMENT — ACTIVITIES OF DAILY LIVING (ADL): ADLS_ACUITY_SCORE: 31

## 2022-09-11 NOTE — PROGRESS NOTES
Darcy noted some clear watery discharge leaking since this morning.  Active FM, denies cxs.  NST reactive, cxs q 2-4 minutes lasting 60-90 secs on [place] monitor.    Wet prep neg for BV, yeast and trich. ROM plus neg.    SVE 3-4/70-80/-3 per RN, similar to clinic exam last week. Reports intercourse last night. Discharge to home. Follow-up in clinic as scheduled.

## 2022-09-11 NOTE — DISCHARGE INSTRUCTIONS
Discharge Instruction for Undelivered Patients      You were seen for: Membrane Assessment  We Consulted: CNM  You had (Test or Medicine):EFM and labs     Diet:   Drink 8 to 12 glasses of liquids (milk, juice, water) every day.  You may eat meals and snacks.     Activity:  Count fetal kicks everyday (see handout)  Call your doctor or nurse midwife if your baby is moving less than usual.     Call your provider if you notice:  Swelling in your face or increased swelling in your hands or legs.  Headaches that are not relieved by Tylenol (acetaminophen).  Changes in your vision (blurring: seeing spots or stars.)  Nausea (sick to your stomach) and vomiting (throwing up).   Weight gain of 5 pounds or more per week.  Heartburn that doesn't go away.  Signs of bladder infection: pain when you urinate (use the toilet), need to go more often and more urgently.  The bag of ramírez (rupture of membranes) breaks, or you notice leaking in your underwear.  Bright red blood in your underwear.  Abdominal (lower belly) or stomach pain.  For first baby: Contractions (tightening) less than 5 minutes apart for one hour or more.  Second (plus) baby: Contractions (tightening) less than 10 minutes apart and getting stronger.  *If less than 34 weeks: Contractions (tightening) more than 6 times in one hour.  Increase or change in vaginal discharge (note the color and amount)  Other:     Follow-up:  As scheduled in the clinic

## 2022-09-15 ENCOUNTER — HOSPITAL ENCOUNTER (INPATIENT)
Facility: CLINIC | Age: 27
LOS: 2 days | Discharge: HOME | End: 2022-09-17
Attending: ADVANCED PRACTICE MIDWIFE | Admitting: ADVANCED PRACTICE MIDWIFE
Payer: COMMERCIAL

## 2022-09-15 ENCOUNTER — ANESTHESIA (OUTPATIENT)
Dept: OBGYN | Facility: CLINIC | Age: 27
End: 2022-09-15
Payer: COMMERCIAL

## 2022-09-15 ENCOUNTER — ANESTHESIA EVENT (OUTPATIENT)
Dept: OBGYN | Facility: CLINIC | Age: 27
End: 2022-09-15
Payer: COMMERCIAL

## 2022-09-15 LAB
ABO/RH(D): NORMAL
ANTIBODY SCREEN, TUBE: NORMAL
BLD PROD TYP BPU: NORMAL
BLD PROD TYP BPU: NORMAL
BLOOD COMPONENT TYPE: NORMAL
BLOOD COMPONENT TYPE: NORMAL
C AG RBC QL: NEGATIVE
CODING SYSTEM: NORMAL
CODING SYSTEM: NORMAL
CROSSMATCH: NORMAL
CROSSMATCH: NORMAL
ERYTHROCYTE [DISTWIDTH] IN BLOOD BY AUTOMATED COUNT: 12.9 % (ref 10–15)
HCT VFR BLD AUTO: 31.2 % (ref 35–47)
HGB BLD-MCNC: 10.8 G/DL (ref 11.7–15.7)
MCH RBC QN AUTO: 31.2 PG (ref 26.5–33)
MCHC RBC AUTO-ENTMCNC: 34.6 G/DL (ref 31.5–36.5)
MCV RBC AUTO: 90 FL (ref 78–100)
PLATELET # BLD AUTO: 225 10E3/UL (ref 150–450)
RBC # BLD AUTO: 3.46 10E6/UL (ref 3.8–5.2)
SARS-COV-2 RNA RESP QL NAA+PROBE: NEGATIVE
SPECIMEN EXPIRATION DATE: NORMAL
UNIT ABO/RH: NORMAL
UNIT NUMBER: NORMAL
UNIT NUMBER: NORMAL
UNIT STATUS: NORMAL
UNIT STATUS: NORMAL
UNIT TYPE ISBT: 1700
WBC # BLD AUTO: 14.3 10E3/UL (ref 4–11)

## 2022-09-15 PROCEDURE — 86850 RBC ANTIBODY SCREEN: CPT | Performed by: ADVANCED PRACTICE MIDWIFE

## 2022-09-15 PROCEDURE — 86780 TREPONEMA PALLIDUM: CPT | Performed by: ADVANCED PRACTICE MIDWIFE

## 2022-09-15 PROCEDURE — 00HU33Z INSERTION OF INFUSION DEVICE INTO SPINAL CANAL, PERCUTANEOUS APPROACH: ICD-10-PCS | Performed by: ANESTHESIOLOGY

## 2022-09-15 PROCEDURE — 120N000001 HC R&B MED SURG/OB

## 2022-09-15 PROCEDURE — 250N000013 HC RX MED GY IP 250 OP 250 PS 637: Performed by: ADVANCED PRACTICE MIDWIFE

## 2022-09-15 PROCEDURE — 86905 BLOOD TYPING RBC ANTIGENS: CPT | Performed by: ADVANCED PRACTICE MIDWIFE

## 2022-09-15 PROCEDURE — 250N000011 HC RX IP 250 OP 636: Performed by: ANESTHESIOLOGY

## 2022-09-15 PROCEDURE — U0003 INFECTIOUS AGENT DETECTION BY NUCLEIC ACID (DNA OR RNA); SEVERE ACUTE RESPIRATORY SYNDROME CORONAVIRUS 2 (SARS-COV-2) (CORONAVIRUS DISEASE [COVID-19]), AMPLIFIED PROBE TECHNIQUE, MAKING USE OF HIGH THROUGHPUT TECHNOLOGIES AS DESCRIBED BY CMS-2020-01-R: HCPCS | Performed by: ADVANCED PRACTICE MIDWIFE

## 2022-09-15 PROCEDURE — 86901 BLOOD TYPING SEROLOGIC RH(D): CPT | Performed by: ADVANCED PRACTICE MIDWIFE

## 2022-09-15 PROCEDURE — 258N000003 HC RX IP 258 OP 636: Performed by: ADVANCED PRACTICE MIDWIFE

## 2022-09-15 PROCEDURE — 10907ZC DRAINAGE OF AMNIOTIC FLUID, THERAPEUTIC FROM PRODUCTS OF CONCEPTION, VIA NATURAL OR ARTIFICIAL OPENING: ICD-10-PCS | Performed by: ADVANCED PRACTICE MIDWIFE

## 2022-09-15 PROCEDURE — 370N000003 HC ANESTHESIA WARD SERVICE

## 2022-09-15 PROCEDURE — C9803 HOPD COVID-19 SPEC COLLECT: HCPCS

## 2022-09-15 PROCEDURE — 85027 COMPLETE CBC AUTOMATED: CPT | Performed by: ADVANCED PRACTICE MIDWIFE

## 2022-09-15 PROCEDURE — 86922 COMPATIBILITY TEST ANTIGLOB: CPT | Performed by: ADVANCED PRACTICE MIDWIFE

## 2022-09-15 PROCEDURE — 3E0R3BZ INTRODUCTION OF ANESTHETIC AGENT INTO SPINAL CANAL, PERCUTANEOUS APPROACH: ICD-10-PCS | Performed by: ANESTHESIOLOGY

## 2022-09-15 PROCEDURE — 36415 COLL VENOUS BLD VENIPUNCTURE: CPT | Performed by: ADVANCED PRACTICE MIDWIFE

## 2022-09-15 RX ORDER — IBUPROFEN 800 MG/1
800 TABLET, FILM COATED ORAL
Status: DISCONTINUED | OUTPATIENT
Start: 2022-09-15 | End: 2022-09-17 | Stop reason: HOSPADM

## 2022-09-15 RX ORDER — CARBOPROST TROMETHAMINE 250 UG/ML
250 INJECTION, SOLUTION INTRAMUSCULAR
Status: DISCONTINUED | OUTPATIENT
Start: 2022-09-15 | End: 2022-09-16 | Stop reason: HOSPADM

## 2022-09-15 RX ORDER — OXYTOCIN/0.9 % SODIUM CHLORIDE 30/500 ML
340 PLASTIC BAG, INJECTION (ML) INTRAVENOUS CONTINUOUS PRN
Status: COMPLETED | OUTPATIENT
Start: 2022-09-15 | End: 2022-09-16

## 2022-09-15 RX ORDER — FENTANYL CITRATE 50 UG/ML
100 INJECTION, SOLUTION INTRAMUSCULAR; INTRAVENOUS
Status: DISCONTINUED | OUTPATIENT
Start: 2022-09-15 | End: 2022-09-16 | Stop reason: HOSPADM

## 2022-09-15 RX ORDER — METOCLOPRAMIDE 10 MG/1
10 TABLET ORAL EVERY 6 HOURS PRN
Status: DISCONTINUED | OUTPATIENT
Start: 2022-09-15 | End: 2022-09-16 | Stop reason: HOSPADM

## 2022-09-15 RX ORDER — PROCHLORPERAZINE 25 MG
25 SUPPOSITORY, RECTAL RECTAL EVERY 12 HOURS PRN
Status: DISCONTINUED | OUTPATIENT
Start: 2022-09-15 | End: 2022-09-16 | Stop reason: HOSPADM

## 2022-09-15 RX ORDER — MISOPROSTOL 200 UG/1
400 TABLET ORAL
Status: DISCONTINUED | OUTPATIENT
Start: 2022-09-15 | End: 2022-09-16 | Stop reason: HOSPADM

## 2022-09-15 RX ORDER — OXYTOCIN 10 [USP'U]/ML
10 INJECTION, SOLUTION INTRAMUSCULAR; INTRAVENOUS
Status: DISCONTINUED | OUTPATIENT
Start: 2022-09-15 | End: 2022-09-17 | Stop reason: HOSPADM

## 2022-09-15 RX ORDER — METOCLOPRAMIDE HYDROCHLORIDE 5 MG/ML
10 INJECTION INTRAMUSCULAR; INTRAVENOUS EVERY 6 HOURS PRN
Status: DISCONTINUED | OUTPATIENT
Start: 2022-09-15 | End: 2022-09-16 | Stop reason: HOSPADM

## 2022-09-15 RX ORDER — METHYLERGONOVINE MALEATE 0.2 MG/ML
200 INJECTION INTRAVENOUS
Status: DISCONTINUED | OUTPATIENT
Start: 2022-09-15 | End: 2022-09-16 | Stop reason: HOSPADM

## 2022-09-15 RX ORDER — OXYTOCIN 10 [USP'U]/ML
10 INJECTION, SOLUTION INTRAMUSCULAR; INTRAVENOUS
Status: DISCONTINUED | OUTPATIENT
Start: 2022-09-15 | End: 2022-09-16 | Stop reason: HOSPADM

## 2022-09-15 RX ORDER — KETOROLAC TROMETHAMINE 30 MG/ML
30 INJECTION, SOLUTION INTRAMUSCULAR; INTRAVENOUS
Status: DISCONTINUED | OUTPATIENT
Start: 2022-09-15 | End: 2022-09-17 | Stop reason: HOSPADM

## 2022-09-15 RX ORDER — ONDANSETRON 2 MG/ML
4 INJECTION INTRAMUSCULAR; INTRAVENOUS EVERY 6 HOURS PRN
Status: DISCONTINUED | OUTPATIENT
Start: 2022-09-15 | End: 2022-09-16 | Stop reason: HOSPADM

## 2022-09-15 RX ORDER — CALCIUM CARBONATE 500 MG/1
1000 TABLET, CHEWABLE ORAL ONCE
Status: COMPLETED | OUTPATIENT
Start: 2022-09-15 | End: 2022-09-15

## 2022-09-15 RX ORDER — CITRIC ACID/SODIUM CITRATE 334-500MG
30 SOLUTION, ORAL ORAL
Status: DISCONTINUED | OUTPATIENT
Start: 2022-09-15 | End: 2022-09-16 | Stop reason: HOSPADM

## 2022-09-15 RX ORDER — PROCHLORPERAZINE MALEATE 10 MG
10 TABLET ORAL EVERY 6 HOURS PRN
Status: DISCONTINUED | OUTPATIENT
Start: 2022-09-15 | End: 2022-09-16 | Stop reason: HOSPADM

## 2022-09-15 RX ORDER — OXYTOCIN/0.9 % SODIUM CHLORIDE 30/500 ML
100-340 PLASTIC BAG, INJECTION (ML) INTRAVENOUS CONTINUOUS PRN
Status: DISCONTINUED | OUTPATIENT
Start: 2022-09-15 | End: 2022-09-17 | Stop reason: HOSPADM

## 2022-09-15 RX ORDER — BUPIVACAINE HYDROCHLORIDE 2.5 MG/ML
INJECTION, SOLUTION EPIDURAL; INFILTRATION; INTRACAUDAL
Status: COMPLETED | OUTPATIENT
Start: 2022-09-15 | End: 2022-09-15

## 2022-09-15 RX ORDER — NALBUPHINE HYDROCHLORIDE 10 MG/ML
2.5-5 INJECTION, SOLUTION INTRAMUSCULAR; INTRAVENOUS; SUBCUTANEOUS EVERY 6 HOURS PRN
Status: DISCONTINUED | OUTPATIENT
Start: 2022-09-15 | End: 2022-09-17 | Stop reason: HOSPADM

## 2022-09-15 RX ORDER — EPHEDRINE SULFATE 50 MG/ML
5 INJECTION, SOLUTION INTRAMUSCULAR; INTRAVENOUS; SUBCUTANEOUS
Status: DISCONTINUED | OUTPATIENT
Start: 2022-09-15 | End: 2022-09-16 | Stop reason: HOSPADM

## 2022-09-15 RX ORDER — NALOXONE HYDROCHLORIDE 0.4 MG/ML
0.4 INJECTION, SOLUTION INTRAMUSCULAR; INTRAVENOUS; SUBCUTANEOUS
Status: DISCONTINUED | OUTPATIENT
Start: 2022-09-15 | End: 2022-09-16 | Stop reason: HOSPADM

## 2022-09-15 RX ORDER — ONDANSETRON 4 MG/1
4 TABLET, ORALLY DISINTEGRATING ORAL EVERY 6 HOURS PRN
Status: DISCONTINUED | OUTPATIENT
Start: 2022-09-15 | End: 2022-09-16 | Stop reason: HOSPADM

## 2022-09-15 RX ORDER — MISOPROSTOL 200 UG/1
800 TABLET ORAL
Status: DISCONTINUED | OUTPATIENT
Start: 2022-09-15 | End: 2022-09-16 | Stop reason: HOSPADM

## 2022-09-15 RX ORDER — NALOXONE HYDROCHLORIDE 0.4 MG/ML
0.2 INJECTION, SOLUTION INTRAMUSCULAR; INTRAVENOUS; SUBCUTANEOUS
Status: DISCONTINUED | OUTPATIENT
Start: 2022-09-15 | End: 2022-09-16 | Stop reason: HOSPADM

## 2022-09-15 RX ORDER — ACETAMINOPHEN 325 MG/1
650 TABLET ORAL EVERY 4 HOURS PRN
Status: DISCONTINUED | OUTPATIENT
Start: 2022-09-15 | End: 2022-09-16 | Stop reason: HOSPADM

## 2022-09-15 RX ORDER — LIDOCAINE 40 MG/G
CREAM TOPICAL
Status: DISCONTINUED | OUTPATIENT
Start: 2022-09-15 | End: 2022-09-15 | Stop reason: HOSPADM

## 2022-09-15 RX ADMIN — CALCIUM CARBONATE (ANTACID) CHEW TAB 500 MG 1000 MG: 500 CHEW TAB at 23:05

## 2022-09-15 RX ADMIN — SODIUM CHLORIDE, POTASSIUM CHLORIDE, SODIUM LACTATE AND CALCIUM CHLORIDE 1000 ML: 600; 310; 30; 20 INJECTION, SOLUTION INTRAVENOUS at 20:45

## 2022-09-15 RX ADMIN — BUPIVACAINE HYDROCHLORIDE 7 ML: 2.5 INJECTION, SOLUTION EPIDURAL; INFILTRATION; INTRACAUDAL at 21:49

## 2022-09-15 RX ADMIN — BUPIVACAINE HYDROCHLORIDE 7 ML: 2.5 INJECTION, SOLUTION EPIDURAL; INFILTRATION; INTRACAUDAL at 21:03

## 2022-09-15 RX ADMIN — Medication: at 21:06

## 2022-09-15 ASSESSMENT — ACTIVITIES OF DAILY LIVING (ADL)
WEAR_GLASSES_OR_BLIND: NO
ADLS_ACUITY_SCORE: 18
DRESSING/BATHING_DIFFICULTY: NO
ADLS_ACUITY_SCORE: 18
CONCENTRATING,_REMEMBERING_OR_MAKING_DECISIONS_DIFFICULTY: NO
CHANGE_IN_FUNCTIONAL_STATUS_SINCE_ONSET_OF_CURRENT_ILLNESS/INJURY: NO
FALL_HISTORY_WITHIN_LAST_SIX_MONTHS: NO
DIFFICULTY_EATING/SWALLOWING: NO
ADLS_ACUITY_SCORE: 18
ADLS_ACUITY_SCORE: 35
TOILETING_ISSUES: NO
ADLS_ACUITY_SCORE: 35
WALKING_OR_CLIMBING_STAIRS_DIFFICULTY: NO
ADLS_ACUITY_SCORE: 18
DOING_ERRANDS_INDEPENDENTLY_DIFFICULTY: NO

## 2022-09-15 NOTE — H&P
HISTORY AND PHYSICAL UPDATE ADMISSION EXAM    Name: Darcy Ruvalcaba  YOB: 1995  Medical Record Number: [id number]    History of Present Illness: Darcy Ruvalcaba is a 26 year old female who is 38w5d pregnant and being admitted for active labor, possible SROM    Estimated Date of Delivery: Sep 24, 2022    EGA 38w5d    OB History    Para Term  AB Living   1 0 0 0 0 0   SAB IAB Ectopic Multiple Live Births   0 0 0 0 0      # Outcome Date GA Lbr Jose Elias/2nd Weight Sex Delivery Anes PTL Lv   1 Current                 Lab Results   Component Value Date    AS Positive (A) 2022    CHPCRT  2015     Negative   Negative for C. trachomatis rRNA by transcription mediated amplification.   A negative result by transcription mediated amplification does not preclude the   presence of C. trachomatis infection because results are dependent on proper   and adequate collection, absence of inhibitors, and sufficient rRNA to be   detected.      GCPCRT  2015     Negative   Negative for N. gonorrhoeae rRNA by transcription mediated amplification.   A negative result by transcription mediated amplification does not preclude the   presence of N. gonorrhoeae infection because results are dependent on proper   and adequate collection, absence of inhibitors, and sufficient rRNA to be   detected.      HGB 13.1 2022       Prenatal Complications:   1.Rh neg-declined Rhogam at 28 weeks as  is also Rh neg  2. Anemia-Hgb 10.1 at 36 weeks  3. Shortened cervix  4. Placenta previa-resolved  5.R ovarian cyst 3.9x3.4x2.8-stable    Exam:      Temp 97.8  F (36.6  C)   LMP 2021     Fetal heart Rate Category 1  Contractions 3-4 minutes    HEENT grossly normal  Neck: no lymphadenopathy or thryoidomegaly  Lungs no resp distress  Heart RRR  ABD gravid, non-tender  EXT:  no edema, moves freely  Vaginal exam 5/80/-2 per RN   Membranes: Lots of amniotic fluid noted in clinic, ROM plus neg  (had blood on swab)  Assessment: labor management, possible SROM    Plan: Admit - see IP orders  Pain medication PRN  SVE 4 hours and as indicated  MD consultant on call Dr Vargas/ available prn  Anticipate     Prenatal record reviewed.    CATHERINE Kaminski CNM      9/15/2022   4:07 PM

## 2022-09-16 LAB — T PALLIDUM AB SER QL: NONREACTIVE

## 2022-09-16 PROCEDURE — 722N000001 HC LABOR CARE VAGINAL DELIVERY SINGLE

## 2022-09-16 PROCEDURE — 120N000001 HC R&B MED SURG/OB

## 2022-09-16 PROCEDURE — 250N000013 HC RX MED GY IP 250 OP 250 PS 637: Performed by: ADVANCED PRACTICE MIDWIFE

## 2022-09-16 PROCEDURE — 250N000009 HC RX 250: Performed by: ADVANCED PRACTICE MIDWIFE

## 2022-09-16 RX ORDER — MISOPROSTOL 200 UG/1
400 TABLET ORAL
Status: DISCONTINUED | OUTPATIENT
Start: 2022-09-16 | End: 2022-09-17 | Stop reason: HOSPADM

## 2022-09-16 RX ORDER — NALOXONE HYDROCHLORIDE 0.4 MG/ML
0.4 INJECTION, SOLUTION INTRAMUSCULAR; INTRAVENOUS; SUBCUTANEOUS
Status: DISCONTINUED | OUTPATIENT
Start: 2022-09-16 | End: 2022-09-17 | Stop reason: HOSPADM

## 2022-09-16 RX ORDER — IBUPROFEN 800 MG/1
800 TABLET, FILM COATED ORAL EVERY 6 HOURS PRN
Status: DISCONTINUED | OUTPATIENT
Start: 2022-09-16 | End: 2022-09-17 | Stop reason: HOSPADM

## 2022-09-16 RX ORDER — HYDROCORTISONE 25 MG/G
CREAM TOPICAL 3 TIMES DAILY PRN
Status: DISCONTINUED | OUTPATIENT
Start: 2022-09-16 | End: 2022-09-17 | Stop reason: HOSPADM

## 2022-09-16 RX ORDER — NALOXONE HYDROCHLORIDE 0.4 MG/ML
0.2 INJECTION, SOLUTION INTRAMUSCULAR; INTRAVENOUS; SUBCUTANEOUS
Status: DISCONTINUED | OUTPATIENT
Start: 2022-09-16 | End: 2022-09-17 | Stop reason: HOSPADM

## 2022-09-16 RX ORDER — OXYTOCIN 10 [USP'U]/ML
10 INJECTION, SOLUTION INTRAMUSCULAR; INTRAVENOUS
Status: DISCONTINUED | OUTPATIENT
Start: 2022-09-16 | End: 2022-09-17 | Stop reason: HOSPADM

## 2022-09-16 RX ORDER — OXYCODONE HYDROCHLORIDE 5 MG/1
5 TABLET ORAL EVERY 4 HOURS PRN
Status: DISCONTINUED | OUTPATIENT
Start: 2022-09-16 | End: 2022-09-17 | Stop reason: HOSPADM

## 2022-09-16 RX ORDER — MODIFIED LANOLIN
OINTMENT (GRAM) TOPICAL
Status: DISCONTINUED | OUTPATIENT
Start: 2022-09-16 | End: 2022-09-17 | Stop reason: HOSPADM

## 2022-09-16 RX ORDER — OXYTOCIN/0.9 % SODIUM CHLORIDE 30/500 ML
340 PLASTIC BAG, INJECTION (ML) INTRAVENOUS CONTINUOUS PRN
Status: DISCONTINUED | OUTPATIENT
Start: 2022-09-16 | End: 2022-09-17 | Stop reason: HOSPADM

## 2022-09-16 RX ORDER — DOCUSATE SODIUM 100 MG/1
100 CAPSULE, LIQUID FILLED ORAL DAILY
Status: DISCONTINUED | OUTPATIENT
Start: 2022-09-16 | End: 2022-09-17 | Stop reason: HOSPADM

## 2022-09-16 RX ORDER — METHYLERGONOVINE MALEATE 0.2 MG/ML
200 INJECTION INTRAVENOUS
Status: DISCONTINUED | OUTPATIENT
Start: 2022-09-16 | End: 2022-09-17 | Stop reason: HOSPADM

## 2022-09-16 RX ORDER — CARBOPROST TROMETHAMINE 250 UG/ML
250 INJECTION, SOLUTION INTRAMUSCULAR
Status: DISCONTINUED | OUTPATIENT
Start: 2022-09-16 | End: 2022-09-17 | Stop reason: HOSPADM

## 2022-09-16 RX ORDER — ACETAMINOPHEN 325 MG/1
650 TABLET ORAL EVERY 4 HOURS PRN
Status: DISCONTINUED | OUTPATIENT
Start: 2022-09-16 | End: 2022-09-17 | Stop reason: HOSPADM

## 2022-09-16 RX ORDER — BISACODYL 10 MG
10 SUPPOSITORY, RECTAL RECTAL DAILY PRN
Status: DISCONTINUED | OUTPATIENT
Start: 2022-09-16 | End: 2022-09-17 | Stop reason: HOSPADM

## 2022-09-16 RX ORDER — MISOPROSTOL 200 UG/1
800 TABLET ORAL
Status: DISCONTINUED | OUTPATIENT
Start: 2022-09-16 | End: 2022-09-17 | Stop reason: HOSPADM

## 2022-09-16 RX ADMIN — Medication 340 ML/HR: at 00:05

## 2022-09-16 RX ADMIN — IBUPROFEN 800 MG: 800 TABLET ORAL at 15:19

## 2022-09-16 RX ADMIN — IBUPROFEN 800 MG: 800 TABLET ORAL at 09:23

## 2022-09-16 RX ADMIN — IBUPROFEN 800 MG: 800 TABLET ORAL at 03:27

## 2022-09-16 RX ADMIN — ACETAMINOPHEN 650 MG: 325 TABLET, FILM COATED ORAL at 09:26

## 2022-09-16 RX ADMIN — IBUPROFEN 800 MG: 800 TABLET ORAL at 22:31

## 2022-09-16 RX ADMIN — ACETAMINOPHEN 650 MG: 325 TABLET, FILM COATED ORAL at 22:30

## 2022-09-16 RX ADMIN — DOCUSATE SODIUM 100 MG: 100 CAPSULE, LIQUID FILLED ORAL at 09:23

## 2022-09-16 RX ADMIN — ACETAMINOPHEN 650 MG: 325 TABLET, FILM COATED ORAL at 15:19

## 2022-09-16 ASSESSMENT — ACTIVITIES OF DAILY LIVING (ADL)
ADLS_ACUITY_SCORE: 18

## 2022-09-16 NOTE — ANESTHESIA PROCEDURE NOTES
Epidural catheter Procedure Note    Pre-Procedure   Staff -        Anesthesiologist:  Jeevan Ly MD       Performed By: anesthesiologist       Location: OB       Procedure Start/Stop Times: 9/15/2022 8:51 PM and 9/15/2022 9:11 PM       Pre-Anesthestic Checklist: patient identified, IV checked, risks and benefits discussed, informed consent, monitors and equipment checked, pre-op evaluation, at physician/surgeon's request and post-op pain management  Timeout:       Correct Patient: Yes        Correct Procedure: Yes        Correct Site: Yes        Correct Position: Yes   Procedure Documentation  Procedure: epidural catheter       Patient Position: sitting       Skin prep: Chloraprep       Local skin infiltrated with mL of 1% lidocaine.        Insertion Site: L3-4. (midline approach).       Technique: LORT air        Needle Type: Touhy needle       Needle Gauge: 18.        Catheter: 20 G.          Catheter threaded easily.             # of attempts: 1 and  # of redirects:     Assessment/Narrative         Paresthesias: No.       Test dose of 3 mL lidocaine 1.5% w/ 1:200,000 epinephrine at 21:00 CDT.         Test dose negative, 3 minutes after injection, for signs of intravascular, subdural, or intrathecal injection.       Insertion/Infusion Method: LORT air       Aspiration negative for Heme or CSF via Epidural Catheter.    Medication(s) Administered   0.25% Bupivacaine PF (Epidural) - EPIDURAL   7 mL - 9/15/2022 9:03:00 PM  Medication Administration Time: 9/15/2022 8:51 PM

## 2022-09-16 NOTE — PLAN OF CARE
Problem: Plan of Care - These are the overarching goals to be used throughout the patient stay.    Goal: Optimal Comfort and Wellbeing  Outcome: Ongoing, Progressing  Intervention: Provide Person-Centered Care  Recent Flowsheet Documentation  Taken 9/15/2022 1600 by Yvonne Howard RN  Trust Relationship/Rapport:   care explained   choices provided     Problem: Labor Pain (Labor)  Goal: Acceptable Pain Control  Outcome: Ongoing, Progressing

## 2022-09-16 NOTE — PROGRESS NOTES
RN at bedside for 15 minutes following Nitrous Oxide 50/50 inhalation initiation. Patient is observed using the equipment appropriately. Patient appears to be coping with labor. Patient is free of side effects.    Rating pain 5/10. Breathing well through contractions.    Taylor Rodas RN

## 2022-09-16 NOTE — LACTATION NOTE
This note was copied from a baby's chart.  Referred to Carmella to assist with a feeding. She reported that Christian is her first baby and he has been somewhat spitty. Lavage technique was suggested but Carmella wanted to wait.  With a gloved finger, a suck assessment was done. It was noted that Christian has a higher palate and is biting more than sucking. Using a NS20mm, a feeding was attempted on the L breast. Some sucking motion was noted but not nutritive.Pumping after feeding attempts was discussed with Carmella once her  returns with smaller breast shields for her Spectra pump. Hand expression was done and colostrum drops were given to Christian.  A gentle body stretch was shown to Carmella to try on Christian before a feeding if not fussy. Benefits of this were discussed. Also, oupt lactation and ECFE were discussed for after discharge. Carmella given resource sheet for cranial sacral therapy if needed for Christian.

## 2022-09-16 NOTE — ANESTHESIA POSTPROCEDURE EVALUATION
Patient: Darcy Ruvalcaba    Procedure: * No procedures listed *       Anesthesia Type:  Epidural    Note:  Disposition: Inpatient   Postop Pain Control: Uneventful            Sign Out: Well controlled pain   PONV: No   Neuro/Psych: Uneventful            Sign Out: Acceptable/Baseline neuro status   Airway/Respiratory: Uneventful            Sign Out: Acceptable/Baseline resp. status   CV/Hemodynamics: Uneventful            Sign Out: Acceptable CV status; No obvious hypovolemia; No obvious fluid overload   Other NRE: NONE   DID A NON-ROUTINE EVENT OCCUR? No    Event details/Postop Comments:  No issues.  No pdph           Last vitals:  Vitals:    09/16/22 0201 09/16/22 0445 09/16/22 0900   BP: 115/68 118/55 111/67   Pulse:   80   Resp:   16   Temp:  36.9  C (98.4  F) 36.6  C (97.9  F)   SpO2:          Electronically Signed By: Tala Deshpande MD  September 16, 2022  2:23 PM

## 2022-09-16 NOTE — ANESTHESIA PREPROCEDURE EVALUATION
Anesthesia Pre-Procedure Evaluation    Patient: Darcy Ruvalcaba   MRN: [id number] : 1995        Procedure : * No procedures listed *          Past Medical History:   Diagnosis Date     Uncomplicated asthma       Past Surgical History:   Procedure Laterality Date     ENT SURGERY      tonsillectomy and adenoidectomy      Allergies   Allergen Reactions     Amoxicillin      Erythromycin      Polymyxin B-Trimethoprim Angioedema and Hives     Eye swelling       Social History     Tobacco Use     Smoking status: Never Smoker     Smokeless tobacco: Never Used   Substance Use Topics     Alcohol use: Not Currently     Alcohol/week: 1.0 standard drink     Types: 1 Shots of liquor per week     Comment: 2-3 per week       Wt Readings from Last 1 Encounters:   22 62.1 kg (137 lb)        Anesthesia Evaluation            ROS/MED HX  ENT/Pulmonary:  - neg pulmonary ROS     Neurologic:  - neg neurologic ROS     Cardiovascular:  - neg cardiovascular ROS     METS/Exercise Tolerance:     Hematologic:  - neg hematologic  ROS     Musculoskeletal:  - neg musculoskeletal ROS     GI/Hepatic:  - neg GI/hepatic ROS     Renal/Genitourinary:  - neg Renal ROS     Endo:  - neg endo ROS     Psychiatric/Substance Use:  - neg psychiatric ROS     Infectious Disease:  - neg infectious disease ROS     Malignancy:  - neg malignancy ROS     Other:  - neg other ROS          Physical Exam    Airway        Mallampati: I   TM distance: > 3 FB   Neck ROM: full     Respiratory Devices and Support         Dental  no notable dental history         Cardiovascular   cardiovascular exam normal          Pulmonary   pulmonary exam normal                OUTSIDE LABS:  CBC:   Lab Results   Component Value Date    WBC 14.3 (H) 09/15/2022    WBC 6.0 2022    HGB 10.8 (L) 09/15/2022    HGB 13.1 2022    HCT 31.2 (L) 09/15/2022    HCT 38.2 2022     09/15/2022     2022     BMP:   Lab Results   Component Value Date      01/30/2018     02/20/2015    POTASSIUM 3.9 01/30/2018    POTASSIUM 3.7 02/20/2015    CHLORIDE 105 01/30/2018    CHLORIDE 109 02/20/2015    CO2 27 01/30/2018    CO2 25 02/20/2015    BUN 15 01/30/2018    BUN 13 02/20/2015    CR 0.72 01/30/2018    CR 0.77 02/20/2015    GLC 80 01/30/2018     (H) 02/20/2015     COAGS: No results found for: PTT, INR, FIBR  POC:   Lab Results   Component Value Date    HCG Negative 01/30/2018     HEPATIC:   Lab Results   Component Value Date    ALBUMIN 4.0 01/30/2018    PROTTOTAL 7.9 01/30/2018    ALT 26 01/30/2018    AST 16 01/30/2018    ALKPHOS 104 01/30/2018    BILITOTAL 0.6 01/30/2018     OTHER:   Lab Results   Component Value Date    BRIANA 9.0 01/30/2018       Anesthesia Plan    ASA Status:  2      Anesthesia Type: Epidural.              Consents    Anesthesia Plan(s) and associated risks, benefits, and realistic alternatives discussed. Questions answered and patient/representative(s) expressed understanding.     - Discussed: Risks, Benefits and Alternatives for the PROCEDURE were discussed     - Discussed with:  Patient, Spouse         Postoperative Care            Comments:                JERRY AUGUSTIN MD

## 2022-09-16 NOTE — L&D DELIVERY NOTE
Vaginal Delivery Note    Name: Darcy Ruvalcaba  : 1995  MRN: [id number]    PRE DELIVERY DIAGNOSIS   26 year old  1 Para 0 at 38w6d      1.Rh neg-declined Rhogam at 28 weeks as  is also Rh neg  2. Anemia-Hgb 10.1 at 36 weeks  3. Shortened cervix  4. Placenta previa-resolved  5.R ovarian cyst 3.9x3.4x2.8-stable      POST DELIVERY DIAGNOSIS  1) 26 year old  @ 38w6d  2) Delivery of a viable infant weighing   via     YOB: 2020     Birth Time: 12:00 AM       Augmentation Yes              Indication: ineffective contraction pattern  Induction No                      Indication: none    Monitors: External     GBS: Negative    ROM: AROM  Fluid Type: Clear    Labor Analgesia/Anesthesia:Nitrous oxide and Epidural    Cord pH obtained: No  Placenta Date/Time: 2022 12:05 AM   Placenta submitted to Pathology: No    Description of procedure:   26 year old  with PNC w/ MWC and pregnancy complicated by see HPI presented to L&D with labor contractions and possible SROM.  Her hospital course was uncomplicated.  Patient progressed to complete with artificial rupture of membranes   Shoulder Dystocia No  Operative Vaginal Delivery No  Infant spontaneously delivered over an labial laceration that is hemostatic and approximates, left unrepaired with permission.  Infant delivered in LUIS ANGEL position with left compound hand  Nuchal cord No        Placenta spontaneously delivered: 2022 12:05 AM  grossly intact with 3 vessel cord.  Infant:  Live, vigorous infant  was handed to mom.    Delivery was complicated by nothing Interventions included fundal massage and pitocin.    Delivery QBL (mL): 125    Mother and Infant stable.    CATHERINE Kaminski CNM, Dr. remains available for consultation and collaboration as needed.      2022 12:20 AM

## 2022-09-16 NOTE — PROGRESS NOTES
VSS and pain well controlled using schedule and prn meds. Breastfeeding and considering supplementation. Ambulates and self cares independently.

## 2022-09-16 NOTE — PROGRESS NOTES
LABOR PROGRESS NOTE  IUP at 38w5d     SUBJECTIVE:  Pain tolerable, controlled with non pharmacologic . She is feeling strong cramping that wraps around her back.  Eating and drinking normally.  is supportive at bedside.    OBJECTIVE:  /71 (BP Location: Left arm, Patient Position: Semi-Street's, Cuff Size: Adult Regular)   Temp 97.8  F (36.6  C) (Oral)   LMP 2021   FHT:  130 bpm, Variability: Moderate (6 - 25 bpm), Accelerations: present and Decelerations: Absent  CONTRACTIONS: TocoFrequency: Every 1-3 minutes, Duration: 60-70 seconds and Intensity: moderate  CERVIX: 7/60/-2, after AROM 3/60/-2  FLUID: clear    ASSESSMENT:    Advanced dilation without active labor, BBOW dilated soft and stretchy cervix to 7cm, then 3cm after AROM      PLAN:   -Discussed R/B/A of amniotomy. She consents to this. Completed with return of clear fluid and Continue position changes to optimize fetal descent.   -Pain management PRN  -SVE in 4 hours and PRN as indicated  -Anticipate     Melva Antoine, CATHERINE CNM

## 2022-09-16 NOTE — PROGRESS NOTES
Up to bathroom with standby assist. Unable to void. Ambulated back to bed without concerns and straight cath done for 350cc clear yellow urine.  Pericare done by pt. New underwear, peripad and tucks in place.   Educated pt on need to push PO fluids and void in the next four hours. Pt is aware and agrees.

## 2022-09-17 VITALS
TEMPERATURE: 98.2 F | OXYGEN SATURATION: 98 % | DIASTOLIC BLOOD PRESSURE: 68 MMHG | RESPIRATION RATE: 18 BRPM | HEART RATE: 72 BPM | SYSTOLIC BLOOD PRESSURE: 112 MMHG

## 2022-09-17 LAB — HGB BLD-MCNC: 9.2 G/DL (ref 11.7–15.7)

## 2022-09-17 PROCEDURE — 250N000013 HC RX MED GY IP 250 OP 250 PS 637: Performed by: ADVANCED PRACTICE MIDWIFE

## 2022-09-17 PROCEDURE — 85018 HEMOGLOBIN: CPT | Performed by: ADVANCED PRACTICE MIDWIFE

## 2022-09-17 PROCEDURE — 36415 COLL VENOUS BLD VENIPUNCTURE: CPT | Performed by: ADVANCED PRACTICE MIDWIFE

## 2022-09-17 RX ORDER — ACETAMINOPHEN 325 MG/1
650 TABLET ORAL EVERY 4 HOURS PRN
COMMUNITY
Start: 2022-09-17 | End: 2022-11-22

## 2022-09-17 RX ORDER — IBUPROFEN 800 MG/1
800 TABLET, FILM COATED ORAL EVERY 6 HOURS PRN
COMMUNITY
Start: 2022-09-17 | End: 2022-11-22

## 2022-09-17 RX ORDER — DOCUSATE SODIUM 100 MG/1
100 CAPSULE, LIQUID FILLED ORAL DAILY
COMMUNITY
Start: 2022-09-17 | End: 2022-11-22

## 2022-09-17 RX ADMIN — DOCUSATE SODIUM 100 MG: 100 CAPSULE, LIQUID FILLED ORAL at 09:13

## 2022-09-17 RX ADMIN — IBUPROFEN 800 MG: 800 TABLET ORAL at 04:16

## 2022-09-17 RX ADMIN — ACETAMINOPHEN 650 MG: 325 TABLET, FILM COATED ORAL at 10:48

## 2022-09-17 RX ADMIN — ACETAMINOPHEN 650 MG: 325 TABLET, FILM COATED ORAL at 04:17

## 2022-09-17 RX ADMIN — IBUPROFEN 800 MG: 800 TABLET ORAL at 10:48

## 2022-09-17 ASSESSMENT — ACTIVITIES OF DAILY LIVING (ADL)
ADLS_ACUITY_SCORE: 18

## 2022-09-17 NOTE — DISCHARGE SUMMARY
OB Discharge Summary      Date:  2022    Name:  Darcy Ruvalcaba  :  1995  MRN:  [id number]      Admission Date:  9/15/2022  Delivery Date: 2022   Gestational Age at Delivery:  38w6d  Discharge Date:  2022    Principal Diagnosis:    Patient Active Problem List   Diagnosis     Other migraine without status migrainosus, not intractable     Pregnancy with 7 completed weeks gestation     Morning sickness     Encounter for triage in pregnant patient     Labor and delivery, indication for care         Conditions complicating Pregnancy:  26 year old  1 Para 0 at 38w6d      1.Rh neg-declined Rhogam at 28 weeks as  is also Rh neg  2. Anemia-Hgb 10.1 at 36 weeks  3. Shortened cervix  4. Placenta previa-resolved  5.R ovarian cyst 3.9x3.4x2.8-stable    Procedure(s) Performed:  NSVB    Indication for :    Indication for Induction:       Condition at Discharge:  stable    Discharge Medications:      Review of your medicines      START taking      Dose / Directions   acetaminophen 325 MG tablet  Commonly known as: TYLENOL  Used for: Care and examination of lactating mother      Dose: 650 mg  Take 2 tablets (650 mg) by mouth every 4 hours as needed for mild pain or fever (greater than or equal to 38  C /100.4  F (oral) or 38.5  C/ 101.4  F (core).)  Refills: 0     docusate sodium 100 MG capsule  Commonly known as: COLACE  Used for: Care and examination of lactating mother      Dose: 100 mg  Take 1 capsule (100 mg) by mouth daily  Refills: 0     ibuprofen 800 MG tablet  Commonly known as: ADVIL/MOTRIN  Used for: Care and examination of lactating mother      Dose: 800 mg  Take 1 tablet (800 mg) by mouth every 6 hours as needed for other (cramping)  Refills: 0        CONTINUE these medicines which have NOT CHANGED      Dose / Directions   ferrous sulfate 142 (45 Fe) MG CR tablet  Commonly known as: SLO-FE      Dose: 142 mg  Take 142 mg by mouth daily  Refills: 0     FIBER-CAPS PO       Refills: 0     fish oil-omega-3 fatty acids 1000 MG capsule      Dose: 2 g  Take 2 g by mouth daily  Refills: 0     lactobacillus rhamnosus (GG) capsule      Dose: 1 capsule  Take 1 capsule by mouth 2 times daily  Refills: 0     prenatal multivitamin w/iron 27-0.8 MG tablet      Dose: 1 tablet  Take 1 tablet by mouth daily  Refills: 0           Where to get your medicines      Some of these will need a paper prescription and others can be bought over the counter. Ask your nurse if you have questions.    You don't need a prescription for these medications    acetaminophen 325 MG tablet    docusate sodium 100 MG capsule    ibuprofen 800 MG tablet          Discharge Plan:    Follow up with /STEPHON:  6 weeks or as needed   Patient Instructions:      Physical activity: As tolerated. Nothing in the vagina for 6 weeks.      Diet:  As tolerated    Medication: see med list    Other:        Physician/CHRISTIANM: Navneet Marie CNM    Name:  Darcy Joy Luebbering  :  1995  MRN:  [id number]

## 2022-09-17 NOTE — PROGRESS NOTES
Vaginal Delivery Postpartum Day 1    Patient Name:  Darcy Ruvalcaba  :      1995  MRN:      [id number]      Assessment:  Normal postpartum course.    Plan:  Continue current care.      Subjective:  The patient feels well:  Voiding without difficulty, lochia normal, tolerating normal diet, ambulating without difficulty and passing flatus. Voiding independently without complication. Pain is well controlled with current medications.  The patient has no emotional concerns.  The baby is well and being fed by breast.      YOB: 2020   Birth Time: 12:00 AM     Prenatal Complications include:    26 year old  1 Para 0 at 38w6d      1.Rh neg-declined Rhogam at 28 weeks as  is also Rh neg  2. Anemia-Hgb 10.1 at 36 weeks  3. Shortened cervix  4. Placenta previa-resolved  5.R ovarian cyst 3.9x3.4x2.8-stable    Objective:  /50 (BP Location: Left arm, Patient Position: Sitting, Cuff Size: Adult Regular)   Pulse 82   Temp 98.4  F (36.9  C) (Oral)   Resp 16   LMP 2021   SpO2 98%   Breastfeeding Unknown   Patient Vitals for the past 24 hrs:   BP Temp Temp src Pulse Resp SpO2   22 0000 102/50 98.4  F (36.9  C) Oral -- 16 98 %   22 1700 108/64 97.9  F (36.6  C) Oral -- 16 100 %   22 1500 111/60 98.2  F (36.8  C) Axillary 82 16 --       Exam: Patient A&O x 3. No acute distress, breathing unlabored.The amount and color of the lochia is appropriate for the duration of recovery. Uterine fundus is firm at U-2. Perineum: no significant edema      Lab Results   Component Value Date    AS Positive (A) 2022    CHPCRT  2015     Negative   Negative for C. trachomatis rRNA by transcription mediated amplification.   A negative result by transcription mediated amplification does not preclude the   presence of C. trachomatis infection because results are dependent on proper   and adequate collection, absence of inhibitors, and sufficient rRNA to be   detected.       GCPCRT  02/20/2015     Negative   Negative for N. gonorrhoeae rRNA by transcription mediated amplification.   A negative result by transcription mediated amplification does not preclude the   presence of N. gonorrhoeae infection because results are dependent on proper   and adequate collection, absence of inhibitors, and sufficient rRNA to be   detected.      HGB 9.2 (L) 09/17/2022       Immunization History   Administered Date(s) Administered     COVID-19,PF,Silvia 10/22/2021     DTAP (<7y) 1995, 02/12/1998, 04/16/1998, 12/04/2002     DTP-Hib 02/12/1998, 04/16/1998, 01/18/1999     HPV Quadrivalent 07/28/2010, 08/22/2011, 02/01/2012     Hep B, Peds or Adolescent 1995, 02/12/1998, 01/18/1999     HepA-ped 2 Dose 03/30/2016     Influenza (IIV3) PF 10/17/2008     Influenza Vaccine IM > 6 months Valent IIV4 (Alfuria,Fluzone) 10/20/2016, 01/21/2019     MMR 01/18/1999, 12/04/2002     Mantoux Tuberculin Skin Test 07/13/2016     Meningococcal (Menactra ) 07/28/2010     Meningococcal (Menveo ) 06/23/2014     OPV, trivalent, live 02/12/1998, 01/18/1999     Poliovirus, inactivated (IPV) 1995, 02/12/1998, 01/18/1999, 12/04/2002     TDAP Vaccine (Adacel) 07/28/2010, 03/20/2019     Typhoid IM 12/06/2018       Provider:  Navneet Marie CNM    Date:  9/17/2022  Time:  9:14 AM

## 2022-09-17 NOTE — PLAN OF CARE
Problem: Pain (Postpartum Vaginal Delivery)  Goal: Acceptable Pain Control  Outcome: Ongoing, Progressing     Problem: Urinary Retention (Postpartum Vaginal Delivery)  Goal: Effective Urinary Elimination  Outcome: Ongoing, Progressing   Pain controlled with PRN ibuprofen, Tylenol, ice packs and tucks. VSS, voiding adequately and up independently in room.

## 2022-09-17 NOTE — PLAN OF CARE
Pt meets discharge criteria     VSS. Up ad lanre independently. Fundus is firm and midline; scant vaginal bleeding. Voiding adequately; +flatus. Pain managed with tylenol and ibuprofen. Tolerating regular diet; denies N/V. Removed IV. Lactation was able to see mom/baby yesterday; breastfeeding every 2-3 hours. Pt soaked in the tub this morning.     I reviewed discharge instructions with patient and answered any remaining questions    Pt was discharged with baby and all personal belongings.

## 2022-09-20 ENCOUNTER — PATIENT OUTREACH (OUTPATIENT)
Dept: CARE COORDINATION | Facility: CLINIC | Age: 27
End: 2022-09-20

## 2022-09-20 NOTE — PROGRESS NOTES
Connected Care Resource Center Contact  [facility]/Voicemail     Clinical Data: Transitional Care Management Outreach     Outreach attempted x 2.  Left message on patient's voicemail, providing [facility]'s 24/7 scheduling and nurse triage phone number [phone] ([phone]) for questions/concerns and/or to schedule an appt with an [facility] provider, if they do not have a PCP.      Plan:  [facility] will do no further outreaches at this time.       Ramírez Avalos  Connected Care Resource Center, [facility]    *Connected Care Resource Team does NOT follow patient ongoing. Referrals are identified based on internal discharge reports and the outreach is to ensure patient has an understanding of their discharge instructions.

## 2022-10-22 ENCOUNTER — HEALTH MAINTENANCE LETTER (OUTPATIENT)
Age: 27
End: 2022-10-22

## 2022-11-18 ENCOUNTER — MYC MEDICAL ADVICE (OUTPATIENT)
Dept: FAMILY MEDICINE | Facility: CLINIC | Age: 27
End: 2022-11-18

## 2022-12-01 ENCOUNTER — IMMUNIZATION (OUTPATIENT)
Dept: FAMILY MEDICINE | Facility: CLINIC | Age: 27
End: 2022-12-01
Payer: COMMERCIAL

## 2022-12-01 PROCEDURE — 90471 IMMUNIZATION ADMIN: CPT

## 2022-12-01 PROCEDURE — 90686 IIV4 VACC NO PRSV 0.5 ML IM: CPT

## 2023-06-01 ENCOUNTER — HEALTH MAINTENANCE LETTER (OUTPATIENT)
Age: 28
End: 2023-06-01

## 2024-02-01 LAB — TREPONEMA PALLIDUM ANTIBODY (EXTERNAL): NONREACTIVE

## 2024-02-27 ENCOUNTER — HOSPITAL ENCOUNTER (OUTPATIENT)
Facility: CLINIC | Age: 29
Discharge: HOME | End: 2024-02-27
Attending: ADVANCED PRACTICE MIDWIFE | Admitting: ADVANCED PRACTICE MIDWIFE
Payer: COMMERCIAL

## 2024-02-27 VITALS
HEART RATE: 120 BPM | HEIGHT: 62 IN | RESPIRATION RATE: 16 BRPM | TEMPERATURE: 99.4 F | BODY MASS INDEX: 26.31 KG/M2 | WEIGHT: 143 LBS | SYSTOLIC BLOOD PRESSURE: 97 MMHG | DIASTOLIC BLOOD PRESSURE: 58 MMHG

## 2024-02-27 VITALS
DIASTOLIC BLOOD PRESSURE: 68 MMHG | SYSTOLIC BLOOD PRESSURE: 118 MMHG | BODY MASS INDEX: 26.46 KG/M2 | TEMPERATURE: 98 F | WEIGHT: 143.8 LBS | HEIGHT: 62 IN | RESPIRATION RATE: 18 BRPM

## 2024-02-27 PROBLEM — Z36.89 ENCOUNTER FOR TRIAGE IN PREGNANT PATIENT: Status: ACTIVE | Noted: 2022-05-04

## 2024-02-27 PROCEDURE — 250N000011 HC RX IP 250 OP 636: Performed by: ADVANCED PRACTICE MIDWIFE

## 2024-02-27 PROCEDURE — G0463 HOSPITAL OUTPT CLINIC VISIT: HCPCS | Mod: 25

## 2024-02-27 PROCEDURE — 96360 HYDRATION IV INFUSION INIT: CPT

## 2024-02-27 PROCEDURE — 258N000003 HC RX IP 258 OP 636: Performed by: ADVANCED PRACTICE MIDWIFE

## 2024-02-27 RX ORDER — LIDOCAINE 40 MG/G
CREAM TOPICAL
Status: DISCONTINUED | OUTPATIENT
Start: 2024-02-27 | End: 2024-02-27 | Stop reason: HOSPADM

## 2024-02-27 RX ORDER — ONDANSETRON 2 MG/ML
4 INJECTION INTRAMUSCULAR; INTRAVENOUS ONCE
Status: COMPLETED | OUTPATIENT
Start: 2024-02-27 | End: 2024-02-27

## 2024-02-27 RX ADMIN — ONDANSETRON 4 MG: 2 INJECTION INTRAMUSCULAR; INTRAVENOUS at 05:24

## 2024-02-27 RX ADMIN — SODIUM CHLORIDE, POTASSIUM CHLORIDE, SODIUM LACTATE AND CALCIUM CHLORIDE 1000 ML: 600; 310; 30; 20 INJECTION, SOLUTION INTRAVENOUS at 05:20

## 2024-02-27 ASSESSMENT — ACTIVITIES OF DAILY LIVING (ADL)
ADLS_ACUITY_SCORE: 18

## 2024-02-27 NOTE — PROGRESS NOTES
Data: Patient presented to [facility]place: 2024  4:43 AM.  Reason for maternal/fetal assessment is nausea and vomiting. Patient reports these symptoms began around 6 hours prior to her arrival.. Patient denies leaking of vaginal fluid/rupture of membranes, vaginal bleeding, headache, visual disturbances, epigastric or RUQ pain, significant edema. Patient reports fetal movement is normal. Patient is a 31w2d .  Prenatal record reviewed. Pregnancy has been uncomplicated.    Vital signs wnl. Support person is present.     Action: Verbal consent for EFM. Triage assessment completed.     Response: Patient verbalized agreement with plan. Will contact ARJUN Santos CNM with update and further orders.     Telephone order from ARJUN Santos CNM for 1L bolus and IV zofran 4mg.

## 2024-02-27 NOTE — DISCHARGE INSTRUCTIONS
Learning About When to Call Your Doctor During Pregnancy (After 20 Weeks)  Overview  It's common to have concerns about what might be a problem when you're pregnant. Most pregnancies don't have any serious problems. But it's still important to know when to call your doctor if you have certain symptoms or signs of labor.  These are general suggestions. Your doctor may give you some more information about when to call.  When to call your doctor (after 20 weeks)  Call 911  anytime you think you may need emergency care. For example, call if:  You have severe vaginal bleeding. This means you are soaking through a pad each hour for 2 or more hours.  You have sudden, severe pain in your belly.  You have chest pain, are short of breath, or cough up blood.  You passed out (lost consciousness).  You have a seizure.  You see or feel the umbilical cord.  You think you are about to deliver your baby and can't make it safely to the hospital or birthing center.  Call your doctor now or seek immediate medical care if:  You have vaginal bleeding.  You have belly pain.  You have a fever.  You are dizzy or lightheaded, or you feel like you may faint.  You have signs of a blood clot in your leg (called a deep vein thrombosis), such as:  Pain in the calf, back of the knee, thigh, or groin.  Swelling in your leg or groin.  A color change on the leg or groin. The skin may be reddish or purplish, depending on your usual skin color.  You have symptoms of preeclampsia, such as:  Sudden swelling of your face, hands, or feet.  New vision problems (such as dimness, blurring, or seeing spots).  A severe headache.  You have a sudden release of fluid from your vagina. (You think your water broke.)  You've been having regular contractions for an hour. This means that you've had at least 6 contractions within 1 hour, even after you change your position and drink fluids.  You notice that your baby has stopped moving or is moving less than  "normal.  You have signs of heart failure, such as:  New or increased shortness of breath.  New or worse swelling in your legs, ankles, or feet.  Sudden weight gain, such as more than 2 to 3 pounds in a day or 5 pounds in a week.  Feeling so tired or weak that you cannot do your usual activities.  You have symptoms of a urinary tract infection. These may include:  Pain or burning when you urinate.  A frequent need to urinate without being able to pass much urine.  Pain in the flank, which is just below the rib cage and above the waist on either side of the back.  Blood in your urine.  Watch closely for changes in your health, and be sure to contact your doctor if:  You have vaginal discharge that smells bad.  You feel sad, anxious, or hopeless for more than a few days.  You have skin changes, such as a rash, itching, or a yellow color to your skin.  You have other concerns about your pregnancy.  If you have labor signs at 37 weeks or more  If you have signs of labor at 37 weeks or more, your doctor may tell you to call when your labor becomes more active. Symptoms of active labor include:  Contractions that are regular.  Contractions that are less than 5 minutes apart.  Contractions that are hard to talk through.  Follow-up care is a key part of your treatment and safety. Be sure to make and go to all appointments, and call your doctor if you are having problems. It's also a good idea to know your test results and keep a list of the medicines you take.  Where can you learn more?  Go to https://www.AG&P.net/patiented  Enter N531 in the search box to learn more about \"Learning About When to Call Your Doctor During Pregnancy (After 20 Weeks).\"  Current as of: July 11, 2023               Content Version: 13.8    2493-0431 [employer].   Care instructions adapted under license by your healthcare professional. If you have questions about a medical condition or this instruction, always ask your healthcare " professional. [employer] disclaims any warranty or liability for your use of this information.

## 2024-02-27 NOTE — PROGRESS NOTES
Pt arrived to [facility] after she states she was talking with a phone triage RN from [facility] regarding an oral zofran order. Pt was advised to return to  L&D for evaluation.   Pt was an outpatient here this morning due to nausea/vomiting/dehydration. She received IV fluids and IV zofran then discharged home.   Pt brought to room 2121, placed on EFM. FHT's reactive, category I. Uterus soft, non tender. VSS. Provider notified. Orders for oral zofran sent to pt's pharmacy. Order to discharge pt home, to follow up in clinic. Pt states understanding.

## 2024-02-27 NOTE — PROGRESS NOTES
Pt reports feeling better and able to keep fluids down.  Pt agreeable to discharge and will call clinic if she feels she needs zofran after discharge.  Discharge instructions reviewed with pt and discharge to home.    Constance Skaggs RN

## 2024-03-14 ENCOUNTER — HOSPITAL ENCOUNTER (OUTPATIENT)
Facility: CLINIC | Age: 29
Discharge: HOME | End: 2024-03-14
Attending: ADVANCED PRACTICE MIDWIFE | Admitting: ADVANCED PRACTICE MIDWIFE
Payer: COMMERCIAL

## 2024-03-14 VITALS — SYSTOLIC BLOOD PRESSURE: 117 MMHG | DIASTOLIC BLOOD PRESSURE: 72 MMHG | TEMPERATURE: 98.3 F | RESPIRATION RATE: 16 BRPM

## 2024-03-14 LAB
ALBUMIN UR-MCNC: NEGATIVE MG/DL
APPEARANCE UR: CLEAR
BACTERIA #/AREA URNS HPF: ABNORMAL /HPF
BILIRUB UR QL STRIP: NEGATIVE
CLUE CELLS: ABNORMAL
COLOR UR AUTO: COLORLESS
GLUCOSE UR STRIP-MCNC: NEGATIVE MG/DL
HGB UR QL STRIP: NEGATIVE
KETONES UR STRIP-MCNC: 10 MG/DL
LEUKOCYTE ESTERASE UR QL STRIP: ABNORMAL
MUCOUS THREADS #/AREA URNS LPF: PRESENT /LPF
NITRATE UR QL: NEGATIVE
PH UR STRIP: 7.5 [PH] (ref 5–7)
RBC URINE: 1 /HPF
SP GR UR STRIP: 1.01 (ref 1–1.03)
SQUAMOUS EPITHELIAL: 3 /HPF
TRICHOMONAS, WET PREP: ABNORMAL
UROBILINOGEN UR STRIP-MCNC: <2 MG/DL
WBC URINE: 9 /HPF
WBC'S/HIGH POWER FIELD, WET PREP: ABNORMAL
YEAST, WET PREP: ABNORMAL

## 2024-03-14 PROCEDURE — 87653 STREP B DNA AMP PROBE: CPT | Performed by: ADVANCED PRACTICE MIDWIFE

## 2024-03-14 PROCEDURE — 87210 SMEAR WET MOUNT SALINE/INK: CPT | Performed by: ADVANCED PRACTICE MIDWIFE

## 2024-03-14 PROCEDURE — 87086 URINE CULTURE/COLONY COUNT: CPT | Performed by: ADVANCED PRACTICE MIDWIFE

## 2024-03-14 PROCEDURE — G0463 HOSPITAL OUTPT CLINIC VISIT: HCPCS

## 2024-03-14 PROCEDURE — 81001 URINALYSIS AUTO W/SCOPE: CPT | Performed by: ADVANCED PRACTICE MIDWIFE

## 2024-03-14 RX ORDER — LIDOCAINE 40 MG/G
CREAM TOPICAL
Status: DISCONTINUED | OUTPATIENT
Start: 2024-03-14 | End: 2024-03-14 | Stop reason: HOSPADM

## 2024-03-14 RX ORDER — FAMOTIDINE 10 MG
10 TABLET ORAL 2 TIMES DAILY
COMMUNITY

## 2024-03-14 ASSESSMENT — ACTIVITIES OF DAILY LIVING (ADL): ADLS_ACUITY_SCORE: 18

## 2024-03-15 LAB — GP B STREP DNA SPEC QL NAA+PROBE: NEGATIVE

## 2024-03-15 NOTE — PROGRESS NOTES
"Data: Patient assessed in the Birthplace for uterine contractions. Membranes intact. Contractions are present. Contactions are mild (patient reports as \"tightening\" , 3-9 minutes apart, and last 70-80 seconds. Uterine assessment is mild by palpation during contractions and soft by palpation at rest. See flowsheets for fetal assessment documentation.     Action: Presumed adequate fetal oxygenation documented. Discharge instructions reviewed. Patient instructed to report change in fetal movement, vaginal leaking of fluid or bleeding, abdominal pain, or any concerns related to the pregnancy to provider/clinic.      Response: Orders to discharge home per S STEPHON Antoine. Patient verbalized understanding of education and agreement with plan. Discharged to home at 2003.           "

## 2024-03-15 NOTE — PROGRESS NOTES
"Data: Patient presented to Birthplace: 3/14/2024  7:09 PM.  Reason for maternal/fetal assessment is uterine contractions. Patient reports that she has had \"rick arriaza\" contractions, but they have been more frequent today. She reports that earlier this afternoon the contractions were more intense, but have since lightened. She denies any painful contractions currently. Patient denies leaking of vaginal fluid/rupture of membranes, vaginal bleeding, abdominal pain, nausea, vomiting, headache, visual disturbances, epigastric or RUQ pain, significant edema. Patient reports fetal movement is normal. Patient is a 34w1d . Prenatal record reviewed. Pregnancy has been uncomplicated. Support person is present.     Cervix 1 cm dilated and 50% effaced. Fetal station -1. Fetal presentation vertex perLeopolds. Membranes: intact.    Vital signs wnl. Patient reports no pain and is coping.     Action: Verbal consent for EFM. Triage assessment completed. Patient may meet criteria for early labor discharge.     Response: Patient verbalized understanding of triage assessment. Melva Antoine CNM is aware of patient's arrival and RN will update with assessment and lab results for further plan of care.        "

## 2024-03-15 NOTE — DISCHARGE INSTRUCTIONS
Learning About When to Call Your Doctor During Pregnancy (After 20 Weeks)  Overview  It's common to have concerns about what might be a problem when you're pregnant. Most pregnancies don't have any serious problems. But it's still important to know when to call your doctor if you have certain symptoms or signs of labor.  These are general suggestions. Your doctor may give you some more information about when to call.  When to call your doctor (after 20 weeks)  Call 911  anytime you think you may need emergency care. For example, call if:  You have severe vaginal bleeding. This means you are soaking through a pad each hour for 2 or more hours.  You have sudden, severe pain in your belly.  You have chest pain, are short of breath, or cough up blood.  You passed out (lost consciousness).  You have a seizure.  You see or feel the umbilical cord.  You think you are about to deliver your baby and can't make it safely to the hospital or birthing center.  Call your doctor now or seek immediate medical care if:  You have vaginal bleeding.  You have belly pain.  You have a fever.  You are dizzy or lightheaded, or you feel like you may faint.  You have signs of a blood clot in your leg (called a deep vein thrombosis), such as:  Pain in the calf, back of the knee, thigh, or groin.  Swelling in your leg or groin.  A color change on the leg or groin. The skin may be reddish or purplish, depending on your usual skin color.  You have symptoms of preeclampsia, such as:  Sudden swelling of your face, hands, or feet.  New vision problems (such as dimness, blurring, or seeing spots).  A severe headache.  You have a sudden release of fluid from your vagina. (You think your water broke.)  You've been having regular contractions for an hour. This means that you've had at least 6 contractions within 1 hour, even after you change your position and drink fluids.  You notice that your baby has stopped moving or is moving less than  "normal.  You have signs of heart failure, such as:  New or increased shortness of breath.  New or worse swelling in your legs, ankles, or feet.  Sudden weight gain, such as more than 2 to 3 pounds in a day or 5 pounds in a week.  Feeling so tired or weak that you cannot do your usual activities.  You have symptoms of a urinary tract infection. These may include:  Pain or burning when you urinate.  A frequent need to urinate without being able to pass much urine.  Pain in the flank, which is just below the rib cage and above the waist on either side of the back.  Blood in your urine.  Watch closely for changes in your health, and be sure to contact your doctor if:  You have vaginal discharge that smells bad.  You feel sad, anxious, or hopeless for more than a few days.  You have skin changes, such as a rash, itching, or a yellow color to your skin.  You have other concerns about your pregnancy.  If you have labor signs at 37 weeks or more  If you have signs of labor at 37 weeks or more, your doctor may tell you to call when your labor becomes more active. Symptoms of active labor include:  Contractions that are regular.  Contractions that are less than 5 minutes apart.  Contractions that are hard to talk through.  Follow-up care is a key part of your treatment and safety. Be sure to make and go to all appointments, and call your doctor if you are having problems. It's also a good idea to know your test results and keep a list of the medicines you take.  Where can you learn more?  Go to https://www.[employer].net/patiented  Enter N531 in the search box to learn more about \"Learning About When to Call Your Doctor During Pregnancy (After 20 Weeks).\"  Current as of: July 10, 2023               Content Version: 14.0    9876-0749 [employer].   Care instructions adapted under license by your healthcare professional. If you have questions about a medical condition or this instruction, always ask your healthcare " professional. "[employer] Incorporated disclaims any warranty or liability for your use of this information.

## 2024-03-16 LAB — BACTERIA UR CULT: NORMAL

## 2024-04-03 ENCOUNTER — APPOINTMENT (OUTPATIENT)
Dept: ULTRASOUND IMAGING | Facility: CLINIC | Age: 29
End: 2024-04-03
Attending: ADVANCED PRACTICE MIDWIFE
Payer: COMMERCIAL

## 2024-04-03 ENCOUNTER — HOSPITAL ENCOUNTER (OUTPATIENT)
Facility: CLINIC | Age: 29
End: 2024-04-03
Admitting: ADVANCED PRACTICE MIDWIFE
Payer: COMMERCIAL

## 2024-04-03 ENCOUNTER — HOSPITAL ENCOUNTER (OUTPATIENT)
Facility: CLINIC | Age: 29
Discharge: HOME | End: 2024-04-03
Attending: ADVANCED PRACTICE MIDWIFE | Admitting: ADVANCED PRACTICE MIDWIFE
Payer: COMMERCIAL

## 2024-04-03 VITALS
HEIGHT: 63 IN | SYSTOLIC BLOOD PRESSURE: 110 MMHG | BODY MASS INDEX: 26.22 KG/M2 | WEIGHT: 148 LBS | RESPIRATION RATE: 16 BRPM | TEMPERATURE: 97.6 F | DIASTOLIC BLOOD PRESSURE: 70 MMHG

## 2024-04-03 LAB
CLUE CELLS: ABNORMAL
CRYSTALS AMN MICRO: ABNORMAL
RUPTURE OF FETAL MEMBRANES BY ROM PLUS: NEGATIVE
TRICHOMONAS, WET PREP: ABNORMAL
WBC'S/HIGH POWER FIELD, WET PREP: ABNORMAL
YEAST, WET PREP: ABNORMAL

## 2024-04-03 PROCEDURE — 84112 EVAL AMNIOTIC FLUID PROTEIN: CPT | Performed by: ADVANCED PRACTICE MIDWIFE

## 2024-04-03 PROCEDURE — G0463 HOSPITAL OUTPT CLINIC VISIT: HCPCS

## 2024-04-03 PROCEDURE — 76815 OB US LIMITED FETUS(S): CPT

## 2024-04-03 PROCEDURE — 87210 SMEAR WET MOUNT SALINE/INK: CPT | Performed by: ADVANCED PRACTICE MIDWIFE

## 2024-04-03 RX ORDER — LIDOCAINE 40 MG/G
CREAM TOPICAL
Status: DISCONTINUED | OUTPATIENT
Start: 2024-04-03 | End: 2024-04-03 | Stop reason: HOSPADM

## 2024-04-03 ASSESSMENT — ACTIVITIES OF DAILY LIVING (ADL)
ADLS_ACUITY_SCORE: 18

## 2024-04-03 NOTE — PROGRESS NOTES
Darcy Ruvalcaba, , 37w0d, arrived to triage. Patient here with c/o more discharge/different discharge since . Patient under care of Maryam Best CNM, did consult provider prior to arrival.    Patient denies contractions except for [place]. Patient denies pain. Patient reports possible SROM? Patient denies vaginal bleeding. Patient denies s/s of preeclampsia. Fetal movement Present. Patient reports hx of anemia this pregnancy.    Patient oriented to room, call light in reach. EFM and toco applied. Triage orders placed.  Provider updated, Maryam Best CNM. ROM+, FERN, and wet prep collected.    Renuka Lim RN

## 2024-04-03 NOTE — PROGRESS NOTES
"Outpatient/Triage Note:    Patient Name:  Darcy Ruvalcaba  :      1995  MRN:      [id number]    Subjective:  Darcy Ruvalcaba is a 28 year old  at 37.0 weeks, who presented to [facility] for evaluation of leaking fluid. Denies bleeding, or changes in fetal movement. Reports BH cx followed by a sneeze on 3/31 and has been intermittently leaking small amounts of clear fluid since. Sent from clinic for eval.     Objective:  Vital signs: /70 (BP Location: Right arm, Patient Position: Semi-Street's, Cuff Size: Adult Regular)   Temp 97.6  F (36.4  C) (Oral)   Resp 16   Ht 1.6 m (5' 3\")   Wt 67.1 kg (148 lb)   BMI 26.22 kg/m    FHR: Category 1, moderate variability, accelerations +, decelerations absent  Uterine contractions: Q4-12 initially, then quiet    Physical Exam: A&Ox3  Abdomen: SIUP, abdomen non-tender  SVE: 1/thick/high per RN  Legs: no edema, moves freely  RN utilized spec to collect swabs, no leaking from os noted  NO fluid noted on pad since arrival per RN    Results:  Recent Results (from the past 168 hour(s))   Rupture of Fetal Membranes by ROM Plus   Result Value Ref Range Status    Rupture of Fetal Membranes by ROM Plus Negative Negative, Invalid, Suggest Repeat Final   Fern Test for Rupture of Membranes   Result Value Ref Range Status    Fern Crystallization Ferning present (A) No ferning present Final   Wet preparation    Specimen: Vagina; Swab   Result Value Ref Range Status    Trichomonas Absent Absent Final    Yeast Absent Absent Final    Clue Cells Absent Absent Final    WBCs/high power field 1+ (A) None Final     Lab called and stated fern slide was negative except in 1 small corner there was small amount of ferning    Ultrasound: CHIDI 7, SDP 3.7    Assessment:   @ 37w0d here for evaluation of leaking fluid.     Plan:   -Wet mount and fern as above. Discussed with Dr. Munoz who recommended ultrasound to check SDP. Ultrasound results and labs reviewed with Dr. Munoz who " states patient can discharge with fluid check next week in clinic. To return with any further LOF.     -Discharge to home undelivered. Reviewed warning signs including decreased fetal movement, leaking of fluid, vaginal bleeding, or signs of labor. Reviewed how to contact on-call provider. Follow-up in clinic with OB provider as scheduled or sooner as needed. All questions answered. Agrees with plan.     Provider: CATHERINE Curtis CNM

## 2024-04-17 ENCOUNTER — ANESTHESIA EVENT (OUTPATIENT)
Dept: SURGERY | Facility: CLINIC | Age: 29
End: 2024-04-17
Payer: COMMERCIAL

## 2024-04-17 ENCOUNTER — HOSPITAL ENCOUNTER (INPATIENT)
Facility: CLINIC | Age: 29
LOS: 2 days | Discharge: HOME | End: 2024-04-19
Attending: REGISTERED NURSE | Admitting: REGISTERED NURSE
Payer: COMMERCIAL

## 2024-04-17 ENCOUNTER — ANESTHESIA (OUTPATIENT)
Dept: SURGERY | Facility: CLINIC | Age: 29
End: 2024-04-17
Payer: COMMERCIAL

## 2024-04-17 PROBLEM — Z34.90 PREGNANCY: Status: ACTIVE | Noted: 2024-04-17

## 2024-04-17 LAB
ABO/RH(D): NORMAL
ANTIBODY SCREEN: NEGATIVE
HGB BLD-MCNC: 12.1 G/DL (ref 11.7–15.7)
SPECIMEN EXPIRATION DATE: NORMAL

## 2024-04-17 PROCEDURE — 250N000011 HC RX IP 250 OP 636: Performed by: NURSE ANESTHETIST, CERTIFIED REGISTERED

## 2024-04-17 PROCEDURE — 00HU33Z INSERTION OF INFUSION DEVICE INTO SPINAL CANAL, PERCUTANEOUS APPROACH: ICD-10-PCS | Performed by: ANESTHESIOLOGY

## 2024-04-17 PROCEDURE — 250N000009 HC RX 250: Performed by: REGISTERED NURSE

## 2024-04-17 PROCEDURE — 250N000009 HC RX 250: Performed by: ANESTHESIOLOGY

## 2024-04-17 PROCEDURE — 250N000011 HC RX IP 250 OP 636: Performed by: ANESTHESIOLOGY

## 2024-04-17 PROCEDURE — 0UQC7ZZ REPAIR CERVIX, VIA NATURAL OR ARTIFICIAL OPENING: ICD-10-PCS | Performed by: OBSTETRICS & GYNECOLOGY

## 2024-04-17 PROCEDURE — 722N000001 HC LABOR CARE VAGINAL DELIVERY SINGLE

## 2024-04-17 PROCEDURE — 86900 BLOOD TYPING SEROLOGIC ABO: CPT | Performed by: REGISTERED NURSE

## 2024-04-17 PROCEDURE — 370N000017 HC ANESTHESIA TECHNICAL FEE, PER MIN: Performed by: OBSTETRICS & GYNECOLOGY

## 2024-04-17 PROCEDURE — 86780 TREPONEMA PALLIDUM: CPT | Performed by: REGISTERED NURSE

## 2024-04-17 PROCEDURE — 10907ZC DRAINAGE OF AMNIOTIC FLUID, THERAPEUTIC FROM PRODUCTS OF CONCEPTION, VIA NATURAL OR ARTIFICIAL OPENING: ICD-10-PCS | Performed by: REGISTERED NURSE

## 2024-04-17 PROCEDURE — 258N000003 HC RX IP 258 OP 636: Performed by: REGISTERED NURSE

## 2024-04-17 PROCEDURE — 360N000075 HC SURGERY LEVEL 2, PER MIN: Performed by: OBSTETRICS & GYNECOLOGY

## 2024-04-17 PROCEDURE — 250N000013 HC RX MED GY IP 250 OP 250 PS 637: Performed by: REGISTERED NURSE

## 2024-04-17 PROCEDURE — 250N000011 HC RX IP 250 OP 636: Performed by: OBSTETRICS & GYNECOLOGY

## 2024-04-17 PROCEDURE — 10D17Z9 MANUAL EXTRACTION OF PRODUCTS OF CONCEPTION, RETAINED, VIA NATURAL OR ARTIFICIAL OPENING: ICD-10-PCS | Performed by: OBSTETRICS & GYNECOLOGY

## 2024-04-17 PROCEDURE — 3E0R3BZ INTRODUCTION OF ANESTHETIC AGENT INTO SPINAL CANAL, PERCUTANEOUS APPROACH: ICD-10-PCS | Performed by: ANESTHESIOLOGY

## 2024-04-17 PROCEDURE — 250N000009 HC RX 250: Performed by: NURSE ANESTHETIST, CERTIFIED REGISTERED

## 2024-04-17 PROCEDURE — 258N000003 HC RX IP 258 OP 636: Performed by: NURSE ANESTHETIST, CERTIFIED REGISTERED

## 2024-04-17 PROCEDURE — 272N000001 HC OR GENERAL SUPPLY STERILE: Performed by: OBSTETRICS & GYNECOLOGY

## 2024-04-17 PROCEDURE — 120N000001 HC R&B MED SURG/OB

## 2024-04-17 PROCEDURE — 85018 HEMOGLOBIN: CPT | Performed by: REGISTERED NURSE

## 2024-04-17 PROCEDURE — 250N000009 HC RX 250: Performed by: OBSTETRICS & GYNECOLOGY

## 2024-04-17 RX ORDER — OXYTOCIN/0.9 % SODIUM CHLORIDE 30/500 ML
340 PLASTIC BAG, INJECTION (ML) INTRAVENOUS CONTINUOUS PRN
Status: DISCONTINUED | OUTPATIENT
Start: 2024-04-17 | End: 2024-04-19 | Stop reason: HOSPADM

## 2024-04-17 RX ORDER — OXYTOCIN 10 [USP'U]/ML
10 INJECTION, SOLUTION INTRAMUSCULAR; INTRAVENOUS
Status: DISCONTINUED | OUTPATIENT
Start: 2024-04-17 | End: 2024-04-19 | Stop reason: HOSPADM

## 2024-04-17 RX ORDER — NALOXONE HYDROCHLORIDE 0.4 MG/ML
0.2 INJECTION, SOLUTION INTRAMUSCULAR; INTRAVENOUS; SUBCUTANEOUS
Status: DISCONTINUED | OUTPATIENT
Start: 2024-04-17 | End: 2024-04-17 | Stop reason: HOSPADM

## 2024-04-17 RX ORDER — LOPERAMIDE HCL 2 MG
4 CAPSULE ORAL
Status: DISCONTINUED | OUTPATIENT
Start: 2024-04-17 | End: 2024-04-19 | Stop reason: HOSPADM

## 2024-04-17 RX ORDER — CEFAZOLIN SODIUM/WATER 2 G/20 ML
2 SYRINGE (ML) INTRAVENOUS SEE ADMIN INSTRUCTIONS
Status: DISCONTINUED | OUTPATIENT
Start: 2024-04-17 | End: 2024-04-17 | Stop reason: HOSPADM

## 2024-04-17 RX ORDER — KETOROLAC TROMETHAMINE 30 MG/ML
30 INJECTION, SOLUTION INTRAMUSCULAR; INTRAVENOUS
Status: DISCONTINUED | OUTPATIENT
Start: 2024-04-17 | End: 2024-04-19 | Stop reason: HOSPADM

## 2024-04-17 RX ORDER — LOPERAMIDE HCL 2 MG
2 CAPSULE ORAL
Status: DISCONTINUED | OUTPATIENT
Start: 2024-04-17 | End: 2024-04-19 | Stop reason: HOSPADM

## 2024-04-17 RX ORDER — NALOXONE HYDROCHLORIDE 0.4 MG/ML
0.2 INJECTION, SOLUTION INTRAMUSCULAR; INTRAVENOUS; SUBCUTANEOUS
Status: DISCONTINUED | OUTPATIENT
Start: 2024-04-17 | End: 2024-04-19 | Stop reason: HOSPADM

## 2024-04-17 RX ORDER — NALBUPHINE HYDROCHLORIDE 20 MG/ML
2.5-5 INJECTION, SOLUTION INTRAMUSCULAR; INTRAVENOUS; SUBCUTANEOUS EVERY 6 HOURS PRN
Status: DISCONTINUED | OUTPATIENT
Start: 2024-04-17 | End: 2024-04-19 | Stop reason: HOSPADM

## 2024-04-17 RX ORDER — METOCLOPRAMIDE HYDROCHLORIDE 5 MG/ML
10 INJECTION INTRAMUSCULAR; INTRAVENOUS EVERY 6 HOURS PRN
Status: DISCONTINUED | OUTPATIENT
Start: 2024-04-17 | End: 2024-04-17 | Stop reason: HOSPADM

## 2024-04-17 RX ORDER — CITRIC ACID/SODIUM CITRATE 334-500MG
30 SOLUTION, ORAL ORAL
Status: DISCONTINUED | OUTPATIENT
Start: 2024-04-17 | End: 2024-04-17 | Stop reason: HOSPADM

## 2024-04-17 RX ORDER — BISACODYL 10 MG
10 SUPPOSITORY, RECTAL RECTAL DAILY PRN
Status: DISCONTINUED | OUTPATIENT
Start: 2024-04-17 | End: 2024-04-19 | Stop reason: HOSPADM

## 2024-04-17 RX ORDER — GLYCOPYRROLATE 0.2 MG/ML
INJECTION, SOLUTION INTRAMUSCULAR; INTRAVENOUS PRN
Status: DISCONTINUED | OUTPATIENT
Start: 2024-04-17 | End: 2024-04-17

## 2024-04-17 RX ORDER — METHYLERGONOVINE MALEATE 0.2 MG/ML
200 INJECTION INTRAVENOUS
Status: DISCONTINUED | OUTPATIENT
Start: 2024-04-17 | End: 2024-04-17 | Stop reason: HOSPADM

## 2024-04-17 RX ORDER — DOCUSATE SODIUM 100 MG/1
100 CAPSULE, LIQUID FILLED ORAL DAILY
Status: DISCONTINUED | OUTPATIENT
Start: 2024-04-17 | End: 2024-04-19 | Stop reason: HOSPADM

## 2024-04-17 RX ORDER — CARBOPROST TROMETHAMINE 250 UG/ML
250 INJECTION, SOLUTION INTRAMUSCULAR
Status: DISCONTINUED | OUTPATIENT
Start: 2024-04-17 | End: 2024-04-19 | Stop reason: HOSPADM

## 2024-04-17 RX ORDER — FENTANYL/ROPIVACAINE/NS/PF 2MCG/ML-.1
PLASTIC BAG, INJECTION (ML) EPIDURAL
Status: DISCONTINUED | OUTPATIENT
Start: 2024-04-17 | End: 2024-04-17 | Stop reason: HOSPADM

## 2024-04-17 RX ORDER — MISOPROSTOL 200 UG/1
1000 TABLET ORAL ONCE
Status: DISCONTINUED | OUTPATIENT
Start: 2024-04-17 | End: 2024-04-17

## 2024-04-17 RX ORDER — PROCHLORPERAZINE 25 MG
25 SUPPOSITORY, RECTAL RECTAL EVERY 12 HOURS PRN
Status: DISCONTINUED | OUTPATIENT
Start: 2024-04-17 | End: 2024-04-17 | Stop reason: HOSPADM

## 2024-04-17 RX ORDER — OXYTOCIN 10 [USP'U]/ML
10 INJECTION, SOLUTION INTRAMUSCULAR; INTRAVENOUS
Status: DISCONTINUED | OUTPATIENT
Start: 2024-04-17 | End: 2024-04-17 | Stop reason: HOSPADM

## 2024-04-17 RX ORDER — MISOPROSTOL 200 UG/1
800 TABLET ORAL
Status: DISCONTINUED | OUTPATIENT
Start: 2024-04-17 | End: 2024-04-17 | Stop reason: HOSPADM

## 2024-04-17 RX ORDER — MODIFIED LANOLIN
OINTMENT (GRAM) TOPICAL
Status: DISCONTINUED | OUTPATIENT
Start: 2024-04-17 | End: 2024-04-19 | Stop reason: HOSPADM

## 2024-04-17 RX ORDER — NALOXONE HYDROCHLORIDE 0.4 MG/ML
0.4 INJECTION, SOLUTION INTRAMUSCULAR; INTRAVENOUS; SUBCUTANEOUS
Status: DISCONTINUED | OUTPATIENT
Start: 2024-04-17 | End: 2024-04-17 | Stop reason: HOSPADM

## 2024-04-17 RX ORDER — MISOPROSTOL 200 UG/1
400 TABLET ORAL
Status: DISCONTINUED | OUTPATIENT
Start: 2024-04-17 | End: 2024-04-19 | Stop reason: HOSPADM

## 2024-04-17 RX ORDER — NALOXONE HYDROCHLORIDE 0.4 MG/ML
0.4 INJECTION, SOLUTION INTRAMUSCULAR; INTRAVENOUS; SUBCUTANEOUS
Status: DISCONTINUED | OUTPATIENT
Start: 2024-04-17 | End: 2024-04-19 | Stop reason: HOSPADM

## 2024-04-17 RX ORDER — METOCLOPRAMIDE 10 MG/1
10 TABLET ORAL EVERY 6 HOURS PRN
Status: DISCONTINUED | OUTPATIENT
Start: 2024-04-17 | End: 2024-04-17 | Stop reason: HOSPADM

## 2024-04-17 RX ORDER — HYDROCORTISONE 25 MG/G
CREAM TOPICAL 3 TIMES DAILY PRN
Status: DISCONTINUED | OUTPATIENT
Start: 2024-04-17 | End: 2024-04-19 | Stop reason: HOSPADM

## 2024-04-17 RX ORDER — IBUPROFEN 800 MG/1
800 TABLET, FILM COATED ORAL EVERY 6 HOURS PRN
Status: DISCONTINUED | OUTPATIENT
Start: 2024-04-17 | End: 2024-04-19 | Stop reason: HOSPADM

## 2024-04-17 RX ORDER — LIDOCAINE HCL/EPINEPHRINE/PF 2%-1:200K
VIAL (ML) INJECTION PRN
Status: DISCONTINUED | OUTPATIENT
Start: 2024-04-17 | End: 2024-04-17

## 2024-04-17 RX ORDER — OXYTOCIN/0.9 % SODIUM CHLORIDE 30/500 ML
340 PLASTIC BAG, INJECTION (ML) INTRAVENOUS CONTINUOUS PRN
Status: DISCONTINUED | OUTPATIENT
Start: 2024-04-17 | End: 2024-04-17 | Stop reason: HOSPADM

## 2024-04-17 RX ORDER — OXYCODONE HYDROCHLORIDE 5 MG/1
5 TABLET ORAL EVERY 4 HOURS PRN
Status: DISCONTINUED | OUTPATIENT
Start: 2024-04-17 | End: 2024-04-19 | Stop reason: HOSPADM

## 2024-04-17 RX ORDER — CEFAZOLIN SODIUM/WATER 2 G/20 ML
2 SYRINGE (ML) INTRAVENOUS
Status: COMPLETED | OUTPATIENT
Start: 2024-04-17 | End: 2024-04-17

## 2024-04-17 RX ORDER — MISOPROSTOL 200 UG/1
800 TABLET ORAL
Status: DISCONTINUED | OUTPATIENT
Start: 2024-04-17 | End: 2024-04-19 | Stop reason: HOSPADM

## 2024-04-17 RX ORDER — FENTANYL CITRATE-0.9 % NACL/PF 10 MCG/ML
100 PLASTIC BAG, INJECTION (ML) INTRAVENOUS EVERY 5 MIN PRN
Status: DISCONTINUED | OUTPATIENT
Start: 2024-04-17 | End: 2024-04-18

## 2024-04-17 RX ORDER — SODIUM CHLORIDE, SODIUM LACTATE, POTASSIUM CHLORIDE, CALCIUM CHLORIDE 600; 310; 30; 20 MG/100ML; MG/100ML; MG/100ML; MG/100ML
INJECTION, SOLUTION INTRAVENOUS CONTINUOUS PRN
Status: DISCONTINUED | OUTPATIENT
Start: 2024-04-17 | End: 2024-04-17

## 2024-04-17 RX ORDER — IBUPROFEN 800 MG/1
800 TABLET, FILM COATED ORAL
Status: DISCONTINUED | OUTPATIENT
Start: 2024-04-17 | End: 2024-04-19 | Stop reason: HOSPADM

## 2024-04-17 RX ORDER — CARBOPROST TROMETHAMINE 250 UG/ML
250 INJECTION, SOLUTION INTRAMUSCULAR
Status: DISCONTINUED | OUTPATIENT
Start: 2024-04-17 | End: 2024-04-17 | Stop reason: HOSPADM

## 2024-04-17 RX ORDER — ONDANSETRON 4 MG/1
4 TABLET, ORALLY DISINTEGRATING ORAL EVERY 6 HOURS PRN
Status: DISCONTINUED | OUTPATIENT
Start: 2024-04-17 | End: 2024-04-17 | Stop reason: HOSPADM

## 2024-04-17 RX ORDER — OXYTOCIN/0.9 % SODIUM CHLORIDE 30/500 ML
100-340 PLASTIC BAG, INJECTION (ML) INTRAVENOUS CONTINUOUS PRN
Status: DISCONTINUED | OUTPATIENT
Start: 2024-04-17 | End: 2024-04-19 | Stop reason: HOSPADM

## 2024-04-17 RX ORDER — METHYLERGONOVINE MALEATE 0.2 MG/ML
200 INJECTION INTRAVENOUS
Status: DISCONTINUED | OUTPATIENT
Start: 2024-04-17 | End: 2024-04-19 | Stop reason: HOSPADM

## 2024-04-17 RX ORDER — MISOPROSTOL 200 UG/1
400 TABLET ORAL
Status: DISCONTINUED | OUTPATIENT
Start: 2024-04-17 | End: 2024-04-17 | Stop reason: HOSPADM

## 2024-04-17 RX ORDER — FENTANYL CITRATE 50 UG/ML
100 INJECTION, SOLUTION INTRAMUSCULAR; INTRAVENOUS
Status: DISCONTINUED | OUTPATIENT
Start: 2024-04-17 | End: 2024-04-17 | Stop reason: HOSPADM

## 2024-04-17 RX ORDER — TRANEXAMIC ACID 10 MG/ML
1 INJECTION, SOLUTION INTRAVENOUS EVERY 30 MIN PRN
Status: DISCONTINUED | OUTPATIENT
Start: 2024-04-17 | End: 2024-04-17 | Stop reason: HOSPADM

## 2024-04-17 RX ORDER — ONDANSETRON 2 MG/ML
INJECTION INTRAMUSCULAR; INTRAVENOUS PRN
Status: DISCONTINUED | OUTPATIENT
Start: 2024-04-17 | End: 2024-04-17

## 2024-04-17 RX ORDER — ONDANSETRON 2 MG/ML
4 INJECTION INTRAMUSCULAR; INTRAVENOUS EVERY 6 HOURS PRN
Status: DISCONTINUED | OUTPATIENT
Start: 2024-04-17 | End: 2024-04-17 | Stop reason: HOSPADM

## 2024-04-17 RX ORDER — ACETAMINOPHEN 325 MG/1
650 TABLET ORAL EVERY 4 HOURS PRN
Status: DISCONTINUED | OUTPATIENT
Start: 2024-04-17 | End: 2024-04-19 | Stop reason: HOSPADM

## 2024-04-17 RX ORDER — FENTANYL CITRATE-0.9 % NACL/PF 10 MCG/ML
100 PLASTIC BAG, INJECTION (ML) INTRAVENOUS EVERY 5 MIN PRN
Status: DISCONTINUED | OUTPATIENT
Start: 2024-04-17 | End: 2024-04-17 | Stop reason: HOSPADM

## 2024-04-17 RX ORDER — LOPERAMIDE HCL 2 MG
2 CAPSULE ORAL
Status: DISCONTINUED | OUTPATIENT
Start: 2024-04-17 | End: 2024-04-17 | Stop reason: HOSPADM

## 2024-04-17 RX ORDER — PROCHLORPERAZINE MALEATE 10 MG
10 TABLET ORAL EVERY 6 HOURS PRN
Status: DISCONTINUED | OUTPATIENT
Start: 2024-04-17 | End: 2024-04-17 | Stop reason: HOSPADM

## 2024-04-17 RX ORDER — LIDOCAINE HYDROCHLORIDE AND EPINEPHRINE 15; 5 MG/ML; UG/ML
INJECTION, SOLUTION EPIDURAL PRN
Status: DISCONTINUED | OUTPATIENT
Start: 2024-04-17 | End: 2024-04-17

## 2024-04-17 RX ORDER — LOPERAMIDE HCL 2 MG
4 CAPSULE ORAL
Status: DISCONTINUED | OUTPATIENT
Start: 2024-04-17 | End: 2024-04-17 | Stop reason: HOSPADM

## 2024-04-17 RX ORDER — TRANEXAMIC ACID 10 MG/ML
1 INJECTION, SOLUTION INTRAVENOUS EVERY 30 MIN PRN
Status: DISCONTINUED | OUTPATIENT
Start: 2024-04-17 | End: 2024-04-19 | Stop reason: HOSPADM

## 2024-04-17 RX ADMIN — PHENYLEPHRINE HYDROCHLORIDE 200 MCG: 10 INJECTION INTRAVENOUS at 21:00

## 2024-04-17 RX ADMIN — GLYCOPYRROLATE 0.2 MG: 0.2 INJECTION INTRAMUSCULAR; INTRAVENOUS at 21:02

## 2024-04-17 RX ADMIN — ONDANSETRON 4 MG: 2 INJECTION INTRAMUSCULAR; INTRAVENOUS at 20:55

## 2024-04-17 RX ADMIN — LIDOCAINE HYDROCHLORIDE,EPINEPHRINE BITARTRATE 2 ML: 20; .005 INJECTION, SOLUTION EPIDURAL; INFILTRATION; INTRACAUDAL; PERINEURAL at 20:36

## 2024-04-17 RX ADMIN — SODIUM CHLORIDE, POTASSIUM CHLORIDE, SODIUM LACTATE AND CALCIUM CHLORIDE: 600; 310; 30; 20 INJECTION, SOLUTION INTRAVENOUS at 20:35

## 2024-04-17 RX ADMIN — GLYCOPYRROLATE 0.2 MG: 0.2 INJECTION INTRAMUSCULAR; INTRAVENOUS at 20:49

## 2024-04-17 RX ADMIN — DOCUSATE SODIUM 100 MG: 100 CAPSULE, LIQUID FILLED ORAL at 23:21

## 2024-04-17 RX ADMIN — LIDOCAINE HYDROCHLORIDE,EPINEPHRINE BITARTRATE 3 ML: 15; .005 INJECTION, SOLUTION EPIDURAL; INFILTRATION; INTRACAUDAL; PERINEURAL at 18:09

## 2024-04-17 RX ADMIN — Medication 340 ML/HR: at 18:54

## 2024-04-17 RX ADMIN — LIDOCAINE HYDROCHLORIDE,EPINEPHRINE BITARTRATE 2 ML: 20; .005 INJECTION, SOLUTION EPIDURAL; INFILTRATION; INTRACAUDAL; PERINEURAL at 20:42

## 2024-04-17 RX ADMIN — Medication: at 18:03

## 2024-04-17 RX ADMIN — SODIUM CHLORIDE, POTASSIUM CHLORIDE, SODIUM LACTATE AND CALCIUM CHLORIDE 1000 ML: 600; 310; 30; 20 INJECTION, SOLUTION INTRAVENOUS at 17:35

## 2024-04-17 RX ADMIN — PHENYLEPHRINE HYDROCHLORIDE 200 MCG: 10 INJECTION INTRAVENOUS at 20:44

## 2024-04-17 RX ADMIN — IBUPROFEN 800 MG: 800 TABLET ORAL at 23:21

## 2024-04-17 RX ADMIN — TRANEXAMIC ACID 1 G: 10 INJECTION, SOLUTION INTRAVENOUS at 19:29

## 2024-04-17 RX ADMIN — LIDOCAINE HYDROCHLORIDE,EPINEPHRINE BITARTRATE 10 ML: 20; .005 INJECTION, SOLUTION EPIDURAL; INFILTRATION; INTRACAUDAL; PERINEURAL at 20:33

## 2024-04-17 RX ADMIN — LIDOCAINE HYDROCHLORIDE,EPINEPHRINE BITARTRATE 2 ML: 15; .005 INJECTION, SOLUTION EPIDURAL; INFILTRATION; INTRACAUDAL; PERINEURAL at 18:10

## 2024-04-17 RX ADMIN — BENZOCAINE AND LEVOMENTHOL: 200; 5 SPRAY TOPICAL at 23:21

## 2024-04-17 RX ADMIN — Medication 2 G: at 20:39

## 2024-04-17 RX ADMIN — PHENYLEPHRINE HYDROCHLORIDE 300 MCG: 10 INJECTION INTRAVENOUS at 20:47

## 2024-04-17 ASSESSMENT — ACTIVITIES OF DAILY LIVING (ADL)
ADLS_ACUITY_SCORE: 18
ADLS_ACUITY_SCORE: 35
ADLS_ACUITY_SCORE: 18

## 2024-04-17 NOTE — PROGRESS NOTES
"Outpatient/Triage Note:    Patient Name:  Darcy Ruvalcaba  :      1995  MRN:      [id number]      Assessment:   @ 39w0d here for evaluation of cramping/bloody show.     Plan:   -Membranes swept, plan recheck in 1-2 hours. Offered elective IOL should she desire this otherwise plan discharge home. Likely early labor.     Subjective:  Darcy Ruvalcaba is a 28 year old  at 39 weeks, who presented to [facility] for evaluation of cramping/bloody show. Denies leaking of fluid, other bleeding, or changes in fetal movement. Reports her first labor was fairly \"painless\" until around 8cm.     Objective:  Vital signs: /67 (BP Location: Right arm, Patient Position: Semi-Street's, Cuff Size: Adult Regular)   Pulse 97   Temp 97.9  F (36.6  C) (Oral)   Resp 16   Ht 1.6 m (5' 3\")   Wt 67.1 kg (148 lb)   BMI 26.22 kg/m    FHR: Category 1, baseline 140, moderate variability, accelerations present, decelerations absent  Uterine contractions: 3-6, mild by palpation    Physical Exam: A&Ox3  Abdomen: SIUP, abdomen non-tender  SVE: 4/60/-2, cephalic, bloody show with exam  Legs: no edema, moves freely      Results:  No results found for this or any previous visit (from the past 168 hour(s)).      Provider: Evelyn Damon CNM  "

## 2024-04-17 NOTE — H&P
"HISTORY AND PHYSICAL UPDATE ADMISSION EXAM    Name: Darcy Ruvalcaba  YOB: 1995  Medical Record Number: [id number]    History of Present Illness: Darcy Ruvalcaba is a 28 year old female who is 39w0d pregnant and being admitted for labor management. Started to have regular, painless contractions earlier this afternoon. Bloody show present. Reports \"painless\" labor until around 8cm with first birth. Supported by her partner, Loy.     Estimated Date of Delivery: 2024    EGA 39w0d    OB History    Para Term  AB Living   2 1 1 0 0 1   SAB IAB Ectopic Multiple Live Births   0 0 0 0 1      # Outcome Date GA Lbr Jose Elias/2nd Weight Sex Type Anes PTL Lv   2 Current            1 Term 22 38w6d  3.06 kg (6 lb 11.9 oz) M Vag-Spont Nitrous, EPI N ESTELA      Name: YVONNE,MALE-DARCY      Apgar1: 9  Apgar5: 10        Lab Results   Component Value Date    AS Positive (A) 2022    CHPCRT  2015     Negative   Negative for C. trachomatis rRNA by transcription mediated amplification.   A negative result by transcription mediated amplification does not preclude the   presence of C. trachomatis infection because results are dependent on proper   and adequate collection, absence of inhibitors, and sufficient rRNA to be   detected.      GCPCRT  2015     Negative   Negative for N. gonorrhoeae rRNA by transcription mediated amplification.   A negative result by transcription mediated amplification does not preclude the   presence of N. gonorrhoeae infection because results are dependent on proper   and adequate collection, absence of inhibitors, and sufficient rRNA to be   detected.      HGB 12.1 2024       Prenatal Complications:   1) Rh negative  2) Anemia    Exam:      /67 (BP Location: Right arm, Patient Position: Semi-Street's, Cuff Size: Adult Regular)   Pulse 97   Temp 97.9  F (36.6  C) (Oral)   Resp 16   Ht 1.6 m (5' 3\")   Wt 67.1 kg (148 lb)   BMI " 26.22 kg/m      Fetal heart Rate Category 1  Contractions 5-10     HEENT grossly normal  Neck: no lymphadenopathy or thryoidomegaly  Lungs CTAB  Heart RRR  ABD gravid, non-tender  EXT:  No edema, moves freely  Vaginal exam 4/60/-2-->5/80/-1   Membranes: intact    Assessment: labor management  GBS negative  B negative    Plan: Admit - see IP orders  Pain medication as desired, considering unmedicated birth with hydrotherapy, open to epidural if needed.  Anticipate   Active management of third stage    Prenatal record reviewed.    STEPHON Pascual Dr. aware of patient status and remains available for consultation and collaboration as needed.    2024   5:32 PM

## 2024-04-17 NOTE — ANESTHESIA PROCEDURE NOTES
Epidural catheter Procedure Note    Pre-Procedure   Staff -        Anesthesiologist:  Tonio Reagan MD       Performed By: anesthesiologist       Location: OB       Procedure Start/Stop Times: 4/17/2024 6:04 PM and 4/17/2024 6:10 PM       Pre-Anesthestic Checklist: patient identified, IV checked, risks and benefits discussed, informed consent, monitors and equipment checked, pre-op evaluation, at physician/surgeon's request and post-op pain management  Timeout:       Correct Patient: Yes        Correct Procedure: Yes        Correct Site: Yes        Correct Position: Yes   Procedure Documentation  Procedure: epidural catheter       Patient Position: sitting       Patient Prep/Sterile Barriers: sterile gloves, mask       Skin prep: Chloraprep       Local skin infiltrated with 3 mL of 1% lidocaine.        Insertion Site: L3-4. (midline approach).       Technique: LORT air        SANDRITA at 7 cm.       Needle type: LUXA.       Needle Gauge: 18.        Needle Length (Inches): 3.5        Catheter: 20 G.          Catheter threaded easily.         4 cm epidural space.         Threaded 11 cm at skin.         # of attempts: 1 and  # of redirects:  0    Assessment/Narrative         Paresthesias: No.       Test dose of 3 mL lidocaine 1.5% w/ 1:200,000 epinephrine at.         Test dose negative, 3 minutes after injection, for signs of intravascular, subdural, or intrathecal injection.       Insertion/Infusion Method: LORT air       No aspiration negative for Heme or CSF via Epidural Catheter.    Medication(s) Administered   Medication Administration Time: 4/17/2024 6:04 PM     Comments:  Single pass epidural. Crisp loss of resistance at *7 cm. Catheter threaded easily 4 cm into the epidural space. Negative aspiration for heme and CSF. Negative test dose. No signs of LAST. No pain or paresthesias upon placement. No complications.    Prior to leaving room, patient reported much improved pain control.        FOR [facility]  "([facility]) ONLY:   Pain Team Contact information: please page the Pain Team Via [employer]. Search \"Pain\". During daytime hours, please page the attending first. At night please page the resident first.      "

## 2024-04-17 NOTE — ANESTHESIA PREPROCEDURE EVALUATION
Anesthesia Pre-Procedure Evaluation    Patient: Darcy Ruvalcaba   MRN: [id number] : 1995        Procedure :           Past Medical History:   Diagnosis Date    Anemia     Uncomplicated asthma       Past Surgical History:   Procedure Laterality Date    ENT SURGERY      tonsillectomy and adenoidectomy      Allergies   Allergen Reactions    Blood Transfusion Related (Informational Only) Other (See Comments)     Patient has a history of a clinically significant antibody against RBC antigens.  A delay in compatible RBCs may occur.    Erythromycin Hives and Nausea and Vomiting    Polymyxin B-Trimethoprim Angioedema and Hives     Eye swelling     Tobramycin      Other Reaction(s): Edema    Facial swelling    Amoxicillin Hives and Rash      Social History     Tobacco Use    Smoking status: Never    Smokeless tobacco: Never   Substance Use Topics    Alcohol use: Not Currently     Alcohol/week: 1.0 standard drink of alcohol     Types: 1 Shots of liquor per week     Comment: 2-3 per week       Wt Readings from Last 1 Encounters:   24 67.1 kg (148 lb)        Anesthesia Evaluation   Pt has had prior anesthetic. Type: OB Labor Epidural.    No history of anesthetic complications       ROS/MED HX  ENT/Pulmonary:     (+)                      asthma                  Neurologic:  - neg neurologic ROS     Cardiovascular:  - neg cardiovascular ROS     METS/Exercise Tolerance:     Hematologic:  - neg hematologic  ROS     Musculoskeletal:       GI/Hepatic:  - neg GI/hepatic ROS     Renal/Genitourinary:       Endo:  - neg endo ROS     Psychiatric/Substance Use:  - neg psychiatric ROS     Infectious Disease:       Malignancy:       Other:            Physical Exam    Airway        Mallampati: I   TM distance: > 3 FB   Neck ROM: full   Mouth opening: > 3 cm    Respiratory Devices and Support         Dental  no notable dental history         Cardiovascular   cardiovascular exam normal          Pulmonary   pulmonary exam  "normal                OUTSIDE LABS:  CBC:   Lab Results   Component Value Date    WBC 14.3 (H) 09/15/2022    WBC 6.0 02/26/2022    HGB 12.1 04/17/2024    HGB 9.2 (L) 09/17/2022    HCT 31.2 (L) 09/15/2022    HCT 38.2 02/26/2022     09/15/2022     02/26/2022     BMP:   Lab Results   Component Value Date     01/30/2018     02/20/2015    POTASSIUM 3.9 01/30/2018    POTASSIUM 3.7 02/20/2015    CHLORIDE 105 01/30/2018    CHLORIDE 109 02/20/2015    CO2 27 01/30/2018    CO2 25 02/20/2015    BUN 15 01/30/2018    BUN 13 02/20/2015    CR 0.72 01/30/2018    CR 0.77 02/20/2015    GLC 80 01/30/2018     (H) 02/20/2015     COAGS: No results found for: \"PTT\", \"INR\", \"FIBR\"  POC:   Lab Results   Component Value Date    HCG Negative 01/30/2018     HEPATIC:   Lab Results   Component Value Date    ALBUMIN 4.0 01/30/2018    PROTTOTAL 7.9 01/30/2018    ALT 26 01/30/2018    AST 16 01/30/2018    ALKPHOS 104 01/30/2018    BILITOTAL 0.6 01/30/2018     OTHER:   Lab Results   Component Value Date    BRIANA 9.0 01/30/2018       Anesthesia Plan    ASA Status:  2       Anesthesia Type: Epidural.              Consents    Anesthesia Plan(s) and associated risks, benefits, and realistic alternatives discussed. Questions answered and patient/representative(s) expressed understanding.     - Discussed:     - Discussed with:  Patient, Spouse            Postoperative Care            Comments:    Other Comments: Patient requests labor epidural.  Chart reviewed, including labs.  I reviewed the options and risks with the patient, including but not limited to: bleeding, infection, damage to tissues under the skin (nerves, muscles, bloodvessels), hypotension, headache, and epidural failure.  The patient's questions were answered and the consent was signed. The patient agrees to elective epidural placement.    Plan to use epidural for retained placenta/ D&C in the OR             neg OB ROS.      Tonio Reagan MD    I have " reviewed the pertinent notes and labs in the chart from the past 30 days and (re)examined the patient.  Any updates or changes from those notes are reflected in this note.

## 2024-04-18 LAB
ERYTHROCYTE [DISTWIDTH] IN BLOOD BY AUTOMATED COUNT: 13.9 % (ref 10–15)
HCT VFR BLD AUTO: 26.2 % (ref 35–47)
HGB BLD-MCNC: 9 G/DL (ref 11.7–15.7)
MCH RBC QN AUTO: 30.3 PG (ref 26.5–33)
MCHC RBC AUTO-ENTMCNC: 34.4 G/DL (ref 31.5–36.5)
MCV RBC AUTO: 88 FL (ref 78–100)
PLATELET # BLD AUTO: 177 10E3/UL (ref 150–450)
RBC # BLD AUTO: 2.97 10E6/UL (ref 3.8–5.2)
T PALLIDUM AB SER QL: NONREACTIVE
WBC # BLD AUTO: 16.3 10E3/UL (ref 4–11)

## 2024-04-18 PROCEDURE — 36415 COLL VENOUS BLD VENIPUNCTURE: CPT | Performed by: ADVANCED PRACTICE MIDWIFE

## 2024-04-18 PROCEDURE — 258N000003 HC RX IP 258 OP 636: Performed by: ADVANCED PRACTICE MIDWIFE

## 2024-04-18 PROCEDURE — 120N000001 HC R&B MED SURG/OB

## 2024-04-18 PROCEDURE — 250N000013 HC RX MED GY IP 250 OP 250 PS 637: Performed by: REGISTERED NURSE

## 2024-04-18 PROCEDURE — 250N000011 HC RX IP 250 OP 636: Performed by: ADVANCED PRACTICE MIDWIFE

## 2024-04-18 PROCEDURE — 85048 AUTOMATED LEUKOCYTE COUNT: CPT | Performed by: ADVANCED PRACTICE MIDWIFE

## 2024-04-18 RX ORDER — DIPHENHYDRAMINE HYDROCHLORIDE 50 MG/ML
50 INJECTION INTRAMUSCULAR; INTRAVENOUS
Status: DISCONTINUED | OUTPATIENT
Start: 2024-04-18 | End: 2024-04-19 | Stop reason: HOSPADM

## 2024-04-18 RX ORDER — ONDANSETRON 2 MG/ML
4 INJECTION INTRAMUSCULAR; INTRAVENOUS EVERY 30 MIN PRN
Status: DISCONTINUED | OUTPATIENT
Start: 2024-04-18 | End: 2024-04-18

## 2024-04-18 RX ORDER — SODIUM CHLORIDE, SODIUM LACTATE, POTASSIUM CHLORIDE, CALCIUM CHLORIDE 600; 310; 30; 20 MG/100ML; MG/100ML; MG/100ML; MG/100ML
INJECTION, SOLUTION INTRAVENOUS CONTINUOUS
Status: DISCONTINUED | OUTPATIENT
Start: 2024-04-18 | End: 2024-04-18

## 2024-04-18 RX ORDER — NALOXONE HYDROCHLORIDE 0.4 MG/ML
0.1 INJECTION, SOLUTION INTRAMUSCULAR; INTRAVENOUS; SUBCUTANEOUS
Status: DISCONTINUED | OUTPATIENT
Start: 2024-04-18 | End: 2024-04-18

## 2024-04-18 RX ORDER — ONDANSETRON 4 MG/1
4 TABLET, ORALLY DISINTEGRATING ORAL EVERY 30 MIN PRN
Status: DISCONTINUED | OUTPATIENT
Start: 2024-04-18 | End: 2024-04-18

## 2024-04-18 RX ORDER — METHYLPREDNISOLONE SODIUM SUCCINATE 125 MG/2ML
125 INJECTION, POWDER, LYOPHILIZED, FOR SOLUTION INTRAMUSCULAR; INTRAVENOUS
Status: DISCONTINUED | OUTPATIENT
Start: 2024-04-18 | End: 2024-04-19 | Stop reason: HOSPADM

## 2024-04-18 RX ADMIN — ACETAMINOPHEN 650 MG: 325 TABLET ORAL at 08:13

## 2024-04-18 RX ADMIN — IBUPROFEN 800 MG: 800 TABLET ORAL at 19:48

## 2024-04-18 RX ADMIN — IBUPROFEN 800 MG: 800 TABLET ORAL at 05:21

## 2024-04-18 RX ADMIN — IRON SUCROSE 200 MG: 20 INJECTION, SOLUTION INTRAVENOUS at 12:19

## 2024-04-18 RX ADMIN — ACETAMINOPHEN 650 MG: 325 TABLET ORAL at 19:48

## 2024-04-18 RX ADMIN — ACETAMINOPHEN 650 MG: 325 TABLET ORAL at 12:16

## 2024-04-18 RX ADMIN — DOCUSATE SODIUM 100 MG: 100 CAPSULE, LIQUID FILLED ORAL at 08:12

## 2024-04-18 RX ADMIN — IBUPROFEN 800 MG: 800 TABLET ORAL at 12:17

## 2024-04-18 ASSESSMENT — ACTIVITIES OF DAILY LIVING (ADL)
ADLS_ACUITY_SCORE: 18

## 2024-04-18 NOTE — PROGRESS NOTES
"Vaginal Delivery Postpartum Day 1    Patient Name:  Darcy Ruvalcaba  :      1995  MRN:      [id number]      Assessment:  Normal postpartum course. S/P cervical lac. Delayed delivery of placenta.    Plan:  Continue current care.CBC this am. Discharge tomorrow.      Subjective:  The patient feels well:  Voiding without difficulty, lochia normal, tolerating normal diet, ambulating without difficulty and passing flatus. She denies headache, vision changes, URQ/epigastric pain, dizziness, lightheadedness, shortness of breath, and tachycardia.  Voiding independently without complication. Pain is well controlled with current medications.  The patient has no emotional concerns.  The baby is well and being fed by breast.    Reviewed need for CL monitoring in future pregnancies.    YOB: 2022   Birth Time: 6:50 PM     Prenatal Complications include:   1) Rh negative  2) Anemia    Objective:  /60 (BP Location: Right arm, Patient Position: Semi-Street's, Cuff Size: Adult Regular)   Pulse 72   Temp 97.6  F (36.4  C) (Oral)   Resp 16   Ht 1.6 m (5' 3\")   Wt 67.1 kg (148 lb)   SpO2 98%   Breastfeeding Unknown   BMI 26.22 kg/m    Patient Vitals for the past 24 hrs:   BP Temp Temp src Pulse Resp SpO2 Height Weight   24 0828 108/60 -- -- -- -- -- -- --   24 0808 92/58 97.6  F (36.4  C) Oral 72 16 98 % -- --   24 0345 105/59 -- -- 86 16 -- -- --   24 -- -- -- -- -- 100 % -- --   24 (!) 144/74 -- -- -- -- (!) 84 % -- --   24 -- -- -- -- -- 100 % -- --   24 124/69 -- -- -- -- 100 % -- --   245 118/66 -- -- -- -- 92 % -- --   240 130/68 -- -- -- -- -- -- --   24 125/60 -- -- -- -- 90 % -- --   24 -- -- -- -- -- 100 % -- --   24 -- -- -- -- -- 100 % -- --   24 -- -- -- -- -- 99 % -- --   24 130/66 -- -- -- -- 100 % -- --   24 (!) 164/ -- -- -- -- 98 % " "-- --   04/17/24 2140 111/53 97.9  F (36.6  C) Axillary -- 20 98 % -- --   04/17/24 2125 120/62 98.1  F (36.7  C) Oral (!) 140 22 99 % -- --   04/17/24 2000 109/70 98  F (36.7  C) Oral -- 19 96 % -- --   04/17/24 1900 -- -- -- -- -- 98 % -- --   04/17/24 1855 -- -- -- -- -- 98 % -- --   04/17/24 1850 -- -- -- -- -- 97 % -- --   04/17/24 1845 110/70 -- -- -- -- 96 % -- --   04/17/24 1840 123/81 -- -- -- -- 99 % -- --   04/17/24 1835 114/82 -- -- -- -- 97 % -- --   04/17/24 1829 -- -- -- -- -- 100 % -- --   04/17/24 1828 114/56 -- -- -- -- 91 % -- --   04/17/24 1824 -- -- -- -- -- 99 % -- --   04/17/24 1821 102/53 -- -- -- -- -- -- --   04/17/24 1819 111/57 -- -- -- -- 100 % -- --   04/17/24 1817 126/57 -- -- -- -- -- -- --   04/17/24 1814 -- -- -- -- -- 98 % -- --   04/17/24 1813 137/62 -- -- -- -- -- -- --   04/17/24 1809 -- -- -- -- -- 100 % -- --   04/17/24 1806 -- -- -- -- -- (!) 88 % -- --   04/17/24 1804 -- -- -- -- -- (!) 71 % -- --   04/17/24 1800 -- -- -- -- -- (!) 66 % -- --   04/17/24 1430 107/67 97.9  F (36.6  C) Oral 97 16 -- 1.6 m (5' 3\") 67.1 kg (148 lb)       Exam: Patient A&O x 3. No acute distress, breathing unlabored.The amount and color of the lochia is appropriate for the duration of recovery. Uterine fundus is firm at U-2. Perineum: no significant edema      Lab Results   Component Value Date    AS Negative 04/17/2024    CHPCRT  02/20/2015     Negative   Negative for C. trachomatis rRNA by transcription mediated amplification.   A negative result by transcription mediated amplification does not preclude the   presence of C. trachomatis infection because results are dependent on proper   and adequate collection, absence of inhibitors, and sufficient rRNA to be   detected.      GCPCRT  02/20/2015     Negative   Negative for N. gonorrhoeae rRNA by transcription mediated amplification.   A negative result by transcription mediated amplification does not preclude the   presence of N. gonorrhoeae " infection because results are dependent on proper   and adequate collection, absence of inhibitors, and sufficient rRNA to be   detected.      HGB 12.1 04/17/2024       Immunization History   Administered Date(s) Administered    COVID-19 Vaccine (Silvia) 10/22/2021    DTAP (<7y) 1995, 02/12/1998, 04/16/1998, 12/04/2002    DTP-Hib 02/12/1998, 04/16/1998, 01/18/1999    HEPATITIS A (PEDS 12M-18Y) 03/30/2016    HPV Quadrivalent 07/28/2010, 08/22/2011, 02/01/2012    Hepatitis B, Peds 1995, 02/12/1998, 01/18/1999    Influenza (IIV3) PF 10/17/2008    Influenza Vaccine >6 months,quad, PF 10/20/2016, 01/21/2019, 12/01/2022    MMR 01/18/1999, 12/04/2002    Mantoux Tuberculin Skin Test 07/13/2016    Meningococcal ACWY (Menactra ) 07/28/2010    Meningococcal ACWY (Menveo ) 06/23/2014    OPV, trivalent, live 02/12/1998, 01/18/1999    Poliovirus, inactivated (IPV) 1995, 02/12/1998, 01/18/1999, 12/04/2002    TDAP Vaccine (Adacel) 07/28/2010, 03/20/2019    Typhoid IM 12/06/2018       Provider:  CATHERINE Kaminski CNM    Date:  4/18/2024  Time:  10:37 AM

## 2024-04-18 NOTE — ANESTHESIA POSTPROCEDURE EVALUATION
Patient: Darcy Ruvalcaba    Procedure: Procedure(s):  CERVICAL REPAIR       Anesthesia Type:  Epidural    Note:  Disposition: Inpatient   Postop Pain Control: Uneventful            Sign Out: Well controlled pain   PONV: No   Neuro/Psych: Uneventful            Sign Out: Acceptable/Baseline neuro status   Airway/Respiratory: Uneventful            Sign Out: Acceptable/Baseline resp. status   CV/Hemodynamics: Uneventful            Sign Out: Acceptable CV status; No obvious hypovolemia; No obvious fluid overload   Other NRE: NONE   DID A NON-ROUTINE EVENT OCCUR? No           Last vitals:  Vitals:    04/17/24 2325 04/17/24 2328 04/18/24 0345   BP: (!) 144/74  105/59   Pulse:   86   Resp:   16   Temp:      SpO2: (!) 84% 100%        Electronically Signed By: Troy Murray MD  April 18, 2024  7:18 AM

## 2024-04-18 NOTE — PLAN OF CARE
Goal Outcome Evaluation: Pt bonding with baby and vitals stable. Her fundus is midline and firm without massage and bleeding is scant. Her pain is well managed with tylenol and ibuprofen. She is breastfeeding baby well. Pt got Iron infusion.     Problem: Postpartum (Vaginal Delivery)  Goal: Absence of Infection Signs and Symptoms  Outcome: Progressing     Problem: Postpartum (Vaginal Delivery)  Goal: Optimal Pain Control and Function  Outcome: Progressing  Intervention: Prevent or Manage Pain  Recent Flowsheet Documentation  Taken 4/18/2024 1633 by Sravani Hopper, RN  Pain Management Interventions: medication (see MAR)  Taken 4/18/2024 1212 by Sravani Hopper, RN  Pain Management Interventions:   sitz bath   medication (see MAR)     Problem: Postpartum (Vaginal Delivery)  Goal: Effective Urinary Elimination  Outcome: Progressing     Problem: Breastfeeding  Goal: Effective Breastfeeding  Outcome: Progressing

## 2024-04-18 NOTE — PROGRESS NOTES
Pt care resumed about 15 minutes postdelivery, placenta still attached. STEPHON Bryant notified Dr. Munoz at 30min post delivery about placenta still attached. Pt states she does not do well with narcotics. They make her puke for over an hour. Dr. Munoz arrived to room around  to evaulate pt. Pt requesting to not do a manual extraction on the unit with narcotics. Pt would rather go down to the OR with sedation. Provider goes over all the risks and benefits. Pt agrees. QBL in drape was 400ml. Pt transferred down to ANDREW at . FOB stays at bedside with .     Edilma Correa RN

## 2024-04-18 NOTE — PROGRESS NOTES
Spoke to CNM after SVE. Plan for pt to be admitted and monitor labor process. Pt feeling more intense ctxs Q2-3 minutes. Planning for epidural. Starting fluids now. FHR category1.

## 2024-04-18 NOTE — PROGRESS NOTES
Reviewed Hgb of 9, discussed wt Carmella. As she is asymptomatic for acute blood loss, IV Iron recommended. She is agreeable.  CATHERINE KaminskiM

## 2024-04-18 NOTE — ANESTHESIA CARE TRANSFER NOTE
Patient: Darcy Ruvalcaba    Procedure: Procedure(s):  CERVICAL REPAIR       Diagnosis: Retained complete placenta [O73.0]  Diagnosis Additional Information: No value filed.    Anesthesia Type:   Epidural     Note:    Oropharynx: oropharynx clear of all foreign objects and spontaneously breathing  Level of Consciousness: awake  Oxygen Supplementation: room air    Independent Airway: airway patency satisfactory and stable  Dentition: dentition unchanged  Vital Signs Stable: post-procedure vital signs reviewed and stable  Report to RN Given: handoff report given  Patient transferred to: Labor and Delivery    Handoff Report: Identifed the Patient, Identified the Reponsible Provider, Reviewed the pertinent medical history, Discussed the surgical course, Reviewed Intra-OP anesthesia mangement and issues during anesthesia, Set expectations for post-procedure period and Allowed opportunity for questions and acknowledgement of understanding      Vitals:  Vitals Value Taken Time   /62 04/17/24 2125   Temp 36.7  C (98.1  F) 04/17/24 2125   Pulse 140 04/17/24 2125   Resp 22 04/17/24 2125   SpO2 89 % 04/17/24 2129   Vitals shown include unfiled device data.    Electronically Signed By: CATHERINE Gregorio CRNA  April 17, 2024  9:33 PM

## 2024-04-18 NOTE — OP NOTE
OPERATIVE NOTE   Patient: Darcy Ruvalcaba  MRN: [id number]  CSN: [id number]    Procedure date: 2024    Preoperative Diagnosis:  1. Retained placenta    Postoperative Diagnosis:   Retained placenta  Cervical laceration    Procedure(s): Procedure(s):  CERVICAL REPAIR    Attending Surgeon: Marcela Munoz MD  Assistant(s): Circulator: Constance Gallo RN  Scrub Person: Shelly Phillip  Anesthesia: Epidural with Block  EBL: 200mL  Fluids: see anesthesia record   Complications: none   Antibiotics:  Ancef 2g  Description of findings:  Placenta spontaneously delivered with strong traction.  2cm cervical laceration at the 4 o'clock position, repaired  Persistently bleeding area on cervix at 6 o'clock position, repaired    Specimens submitted:  None    Indication for procedure:  29 yo G2 now  who delivered via . After 30 minutes the placenta had not spontaneously delivered despite active management. Patient does not tolerate IV pain medications well so decision made to proceed to OR. EBL in room 400cc.    PROCEDURE     The patient was explained the procedure. All risks, benefits, alternatives were discussed. The consent form was signed with a witness present.    The patient was taken to the OR. Her epidural was bolused by anesthesia and appropriate pain control was achieved. The patient was then placed in dorsal lithotomy position using stirrups. Then, the patient was prepped and draped in usual sterile fashion.    The umbilical cord was grasped with the clamp and traction applied, with counter traction on the fundal portion of the uterus. The placenta spontaneously delivered and was found to be intact. A uterine sweep was performed and no retained  membranes were palpated. The uterine tone was firm, however there was still brisk bleeding noted so the cervix was inspected using two ring forceps in a circumferential fashion. A 2cm laceration was found at the 4 o'clock position and repaired using 3-0 Vicryl in a  running locked fashion. A small, persistent bleed was found at the 6 o'clock position so a figure of eight stitch using the same 3-0 Vicryl was used to achieve hemostasis. The rest of the cervix was intact.     Ins and outs were noted. All needle, instrument, and lap sponge count correct x 2. The procedure was overall without complication. The patient was repositioned to dorsal supine position, extubated without event and taken to the recovery room in stable condition.    DO [facility]

## 2024-04-18 NOTE — L&D DELIVERY NOTE
Vaginal Delivery Note    Name: Darcy Ruvalcaba  : 1995  MRN: [id number]    PRE DELIVERY DIAGNOSIS  28 year old  2 Para 1001 at 39w0d      1) Rh negative  2) Anemia    POST DELIVERY DIAGNOSIS  1) 28 year old  @ 39w0d  2) Delivery of a viable infant weighing 7lb5oz   via     YOB: 2022     Birth Time: 6:50 PM       Augmentation No             Indication:   Induction No                      Indication:     Monitors: External     GBS: Negative    ROM: AROM  Fluid Type: Clear    Labor Analgesia/Anesthesia:Epidural    Cord pH obtained: No  Placenta Date/Time:    Placenta submitted to Pathology: No    Description of procedure:   28 year old  with PNC w/ MWC and pregnancy complicated by  the above list  presented to L&D with labor contractions.  Her hospital course was uncomplicated.  Patient progressed to complete spontaneously following membrane sweep. 4cm  and irregularlly sanjana at 1500 with membranes sweep, 7-8cm at 1750. Complete at 1813 after epidural placement, unable to achieve left sided coverage.  Carmella pushed with effective efforts and brought the baby to a slow controlled crown.  The anterior shoulder delivered easily with gentle downward traction paired with maternal efforts. Partner Loy assisted with hands over hands to lift baby to mother's chest. Total second stage 20 minutes. Darcy Ruvalcaba and Loy welcomed their daughter, Rajendra.   Shoulder Dystocia No  Operative Vaginal Delivery No  Infant spontaneously delivered over an intact perineum. See repair note by Dr. Munoz for cervical laceration.    Infant delivered in JOSE position.  Nuchal cord Yes    Reduced    Placenta spontaneously delivered:   grossly intact with 3 vessel cord.  Infant:  Live, vigorous infant was handed to mom.    Delivery was complicated by retained placenta Interventions included fundal massage, pitocin, and TXA. See Dr. Munoz's note for placental removal. NATALYA to her at 30  minutes postpartum to plan for manual removal in OR.     Delivery QBL (mL): 400    Mother and Infant stable.    STEPHON Pascual Dr. remained available for consultation and collaboration as needed.      4/17/2024 9:17 PM

## 2024-04-18 NOTE — PROGRESS NOTES
Pt recovered from vaginal delivery with a retained placenta and a cervical lac with repair in the OR. VSS. Fundas is firm 2 below umbilicus. Pt was able to ambulate to the restroom and voided 500mL. Vaginal bleeding is scant.  is at bedside. DIALLO Granado is very supportive and participates in all the cares for mom and .  was feeding for over a hour.     Edilma Correa RN

## 2024-04-18 NOTE — PLAN OF CARE
Problem: Postpartum (Vaginal Delivery)  Goal: Hemostasis  Outcome: Progressing   Goal Outcome Evaluation:       Mom has had scant bleeding since being at the OR for manual removal of placental product. Her vs are stable and she's coping well.

## 2024-04-18 NOTE — PROGRESS NOTES
"PROGRESS NOTE  Patient Name:  Darcy Ruvalcaba  :      1995  MRN:      [id number]    SUBJECTIVE:  CTAP due to retained placenta. Midwife has attempted all maneuvers to remove placenta with no movement of placenta. Patient does not tolerate fentanyl and was unable to tolerate an in room uterine sweep.     OBJECTIVE:  /70   Pulse 97   Temp 97.9  F (36.6  C) (Oral)   Resp 16   Ht 1.6 m (5' 3\")   Wt 67.1 kg (148 lb)   SpO2 98%   BMI 26.22 kg/m      EBL so far 400cc      ASSESSMENT:  Complete retained placenta    PLAN:   Will proceed to OR for manual removal of placenta, dilation and curettage  -Anesthesia notified  -Will need to do in main OR   -Kefzol ordered preoperatively  -Patient consented for procedure. -Risks and benefits reviewed. Pt aware of risk of bleeding, infection, damage to surrounding organs, and possible need for further surgery including hysterectomy. She is accepting of these risks.       DO [facility]     "

## 2024-04-19 VITALS
HEIGHT: 63 IN | WEIGHT: 148 LBS | BODY MASS INDEX: 26.22 KG/M2 | TEMPERATURE: 98.3 F | RESPIRATION RATE: 16 BRPM | OXYGEN SATURATION: 98 % | SYSTOLIC BLOOD PRESSURE: 107 MMHG | DIASTOLIC BLOOD PRESSURE: 58 MMHG | HEART RATE: 86 BPM

## 2024-04-19 LAB
BILIRUB SKIN-MCNC: 6.7 MG/DL (ref 0–15)
BILIRUB SKIN-MCNC: NORMAL MG/DL

## 2024-04-19 PROCEDURE — 250N000013 HC RX MED GY IP 250 OP 250 PS 637: Performed by: REGISTERED NURSE

## 2024-04-19 PROCEDURE — 258N000003 HC RX IP 258 OP 636: Performed by: ADVANCED PRACTICE MIDWIFE

## 2024-04-19 PROCEDURE — 250N000011 HC RX IP 250 OP 636: Performed by: ADVANCED PRACTICE MIDWIFE

## 2024-04-19 RX ORDER — IBUPROFEN 800 MG/1
800 TABLET, FILM COATED ORAL EVERY 8 HOURS PRN
Qty: 30 TABLET | Refills: 0 | Status: SHIPPED | OUTPATIENT
Start: 2024-04-19

## 2024-04-19 RX ORDER — ACETAMINOPHEN 325 MG/1
650 TABLET ORAL EVERY 4 HOURS PRN
Qty: 30 TABLET | Refills: 0 | Status: SHIPPED | OUTPATIENT
Start: 2024-04-19

## 2024-04-19 RX ADMIN — IBUPROFEN 800 MG: 800 TABLET ORAL at 02:10

## 2024-04-19 RX ADMIN — IBUPROFEN 800 MG: 800 TABLET ORAL at 08:11

## 2024-04-19 RX ADMIN — DOCUSATE SODIUM 100 MG: 100 CAPSULE, LIQUID FILLED ORAL at 08:11

## 2024-04-19 RX ADMIN — IRON SUCROSE 200 MG: 20 INJECTION, SOLUTION INTRAVENOUS at 12:03

## 2024-04-19 RX ADMIN — ACETAMINOPHEN 650 MG: 325 TABLET ORAL at 02:10

## 2024-04-19 RX ADMIN — ACETAMINOPHEN 650 MG: 325 TABLET ORAL at 08:11

## 2024-04-19 ASSESSMENT — ACTIVITIES OF DAILY LIVING (ADL)
ADLS_ACUITY_SCORE: 18

## 2024-04-19 NOTE — PLAN OF CARE
Problem: Adult Inpatient Plan of Care  Goal: Plan of Care Review  Description: The Plan of Care Review/Shift note should be completed every shift.  The Outcome Evaluation is a brief statement about your assessment that the patient is improving, declining, or no change.  This information will be displayed automatically on your shift  note.  Outcome: Progressing  Flowsheets (Taken 4/19/2024 1035)  Plan of Care Reviewed With: patient     Problem: Postpartum (Vaginal Delivery)  Goal: Effective Urinary Elimination  Outcome: Progressing  Intervention: Monitor and Manage Urinary Retention  Recent Flowsheet Documentation  Taken 4/19/2024 0953 by Shanel Licona, RN  Urinary Elimination Promotion: absorbent pad/diaper use encouraged     Problem: Postpartum (Vaginal Delivery)  Goal: Optimal Pain Control and Function  Outcome: Progressing   Goal Outcome Evaluation:      Plan of Care Reviewed With: patient      Patient is resting comfortably in room, c/o pains and rates it at 3/10. Medication given as needed and is effective. Vitals are stable, fundus firm, midline, and lochia is scant, voiding spontaneously without problems, ambulating independently in room. Breast feeding infant and  starting pumping. Denies dizziness or light headedness at present.

## 2024-04-19 NOTE — PROGRESS NOTES
"Vaginal Delivery Postpartum Day 2    Patient Name:  Darcy Ruvalcaba  :      1995  MRN:      [id number]      Assessment:  Normal postpartum course. Acute blood loss anemia secondary to delayed delivery of placenta and repair of cervical lacerations.    Plan:  Continue current care. Discharge to home.    Subjective:  The patient feels well:  Voiding without difficulty, lochia normal, tolerating normal diet, ambulating without difficulty and passing flatus.  She denies headache, vision changes, URQ/epigastric pain, dizziness, lightheadedness, shortness of breath, and tachycardia. Voiding independently without complication. Pain is well controlled with current medications.  The patient has no emotional concerns.  The baby is well and being fed by breast.     YOB: 2022   Birth Time: 6:50 PM     Prenatal complications:  1) Rh negative  2) Anemia    Objective:  /58 (BP Location: Right arm, Patient Position: Semi-Street's, Cuff Size: Adult Regular)   Pulse 86   Temp 98.3  F (36.8  C) (Oral)   Resp 16   Ht 1.6 m (5' 3\")   Wt 67.1 kg (148 lb)   SpO2 98%   Breastfeeding Unknown   BMI 26.22 kg/m      .  Patient Vitals for the past 24 hrs:   BP Temp Temp src Pulse Resp SpO2   24 0201 107/58 98.3  F (36.8  C) Oral 86 16 --   24 1633 102/57 98.3  F (36.8  C) Oral 78 16 98 %   24 1240 98/66 97.8  F (36.6  C) Oral 77 16 99 %   24 1212 107/59 97.9  F (36.6  C) Oral 97 16 98 %       Exam: Patient A&O x 3. No acute distress, breathing unlabored.The amount and color of the lochia is appropriate for the duration of recovery. Nursing notes reviewed.    Lab Results   Component Value Date    AS Negative 2024    CHPCRT  2015     Negative   Negative for C. trachomatis rRNA by transcription mediated amplification.   A negative result by transcription mediated amplification does not preclude the   presence of C. trachomatis infection because results are dependent on " proper   and adequate collection, absence of inhibitors, and sufficient rRNA to be   detected.      GCPCRT  02/20/2015     Negative   Negative for N. gonorrhoeae rRNA by transcription mediated amplification.   A negative result by transcription mediated amplification does not preclude the   presence of N. gonorrhoeae infection because results are dependent on proper   and adequate collection, absence of inhibitors, and sufficient rRNA to be   detected.      HGB 9.0 (L) 04/18/2024       Immunization History   Administered Date(s) Administered    COVID-19 Vaccine (Silvia) 10/22/2021    DTAP (<7y) 1995, 02/12/1998, 04/16/1998, 12/04/2002    DTP-Hib 02/12/1998, 04/16/1998, 01/18/1999    HEPATITIS A (PEDS 12M-18Y) 03/30/2016    HPV Quadrivalent 07/28/2010, 08/22/2011, 02/01/2012    Hepatitis B, Peds 1995, 02/12/1998, 01/18/1999    Influenza (IIV3) PF 10/17/2008    Influenza Vaccine >6 months,quad, PF 10/20/2016, 01/21/2019, 12/01/2022    MMR 01/18/1999, 12/04/2002    Mantoux Tuberculin Skin Test 07/13/2016    Meningococcal ACWY (Menactra ) 07/28/2010    Meningococcal ACWY (Menveo ) 06/23/2014    OPV, trivalent, live 02/12/1998, 01/18/1999    Poliovirus, inactivated (IPV) 1995, 02/12/1998, 01/18/1999, 12/04/2002    TDAP Vaccine (Adacel) 07/28/2010, 03/20/2019    Typhoid IM 12/06/2018       Provider:  CATHERINE Waddell CNM    Date:  4/19/2024  Time:  10:25 AM

## 2024-04-19 NOTE — PROGRESS NOTES
Disregard TCB of 6.7 lab result on patient. No TCB was taken on this patient.This was the baby's TCB that was meant to be added to baby's chart.  Linh Hardin RN

## 2024-04-19 NOTE — PLAN OF CARE
Goal: Absence of Hospital-Acquired Illness or Injury  Outcome: Progressing  Intervention: Prevent Skin Injury  Recent Flowsheet Documentation  Taken 4/19/2024 0210 by Linh Hardin RN  Body Position: position changed independently  Taken 4/19/2024 0200 by Linh Hardin RN  Body Position: position changed independently  Taken 4/18/2024 2000 by Linh Hardin RN  Body Position: position changed independently  Goal: Optimal Comfort and Wellbeing  Outcome: Progressing  Intervention: Monitor Pain and Promote Comfort  Recent Flowsheet Documentation  Taken 4/19/2024 0210 by Linh Hardin RN  Pain Management Interventions: medication (see MAR)  Intervention: Provide Person-Centered Care  Recent Flowsheet Documentation  Taken 4/19/2024 0200 by Linh Hardin RN  Trust Relationship/Rapport:   care explained   choices provided   emotional support provided   questions answered   thoughts/feelings acknowledged  Taken 4/18/2024 2000 by Linh Hardin RN  Trust Relationship/Rapport:   care explained   choices provided   emotional support provided   questions answered   thoughts/feelings acknowledged  Goal: Readiness for Transition of Care  Outcome: Progressing     Problem: Postpartum (Vaginal Delivery)  Goal: Successful Parent Role Transition  Outcome: Progressing  Goal: Hemostasis  Outcome: Progressing  Goal: Absence of Infection Signs and Symptoms  Outcome: Progressing  Goal: Anesthesia/Sedation Recovery  Outcome: Progressing  Goal: Optimal Pain Control and Function  Outcome: Progressing  Intervention: Prevent or Manage Pain  Recent Flowsheet Documentation  Taken 4/19/2024 0210 by Linh Hardin RN  Pain Management Interventions: medication (see MAR)  Goal: Effective Urinary Elimination  Outcome: Progressing     Problem: Breastfeeding  Goal: Effective Breastfeeding  Outcome: Progressing   Goal Outcome Evaluation:         Baby breastfeeding on demand every 2-3 hours. Pain controlled with ibuprofen and  tylenol. Up independently, voiding without difficulty. Postpartum assessment WNL, see flowsheets for more information. Significant other at bedside, supportive. Caregiver education and support on-going.    Linh Hardin RN

## 2024-04-19 NOTE — PROGRESS NOTES
Patient discharge home in a stable condition, all instructions given and she verbalized understanding.

## 2024-04-19 NOTE — DISCHARGE SUMMARY
OB Discharge Summary      Date:  2024    Name:  Darcy Ruvalcaba  :  1995  MRN:  [id number]      Admission Date:  2024  Delivery Date: 2024   Gestational Age at Delivery:  39w0d  Discharge Date:  2024    Principal Diagnosis:    Patient Active Problem List   Diagnosis    Other migraine without status migrainosus, not intractable    Pregnancy with 7 completed weeks gestation    Morning sickness    Encounter for triage in pregnant patient    Labor and delivery, indication for care    Pregnancy         Conditions complicating Pregnancy:   1) Rh negative  2) Anemia    Procedure(s) Performed:  Epidural, , repair of cervical laceration    Indication for :  None  Indication for Induction:  None     Condition at Discharge:  Good    Discharge Medications:      Review of your medicines        START taking        Dose / Directions   acetaminophen 325 MG tablet  Commonly known as: TYLENOL  Used for:  (normal spontaneous vaginal delivery)      Dose: 650 mg  Take 2 tablets (650 mg) by mouth every 4 hours as needed for mild pain  Quantity: 30 tablet  Refills: 0     ibuprofen 800 MG tablet  Commonly known as: ADVIL/MOTRIN  Used for:  (normal spontaneous vaginal delivery)      Dose: 800 mg  Take 1 tablet (800 mg) by mouth every 8 hours as needed for other (cramping)  Quantity: 30 tablet  Refills: 0            CONTINUE these medicines which have NOT CHANGED        Dose / Directions   docusate sodium 50 MG capsule  Commonly known as: COLACE      Dose: 50 mg  Take 50 mg by mouth 2 times daily  Refills: 0     famotidine 10 MG tablet  Commonly known as: PEPCID  Indication: Heartburn      Dose: 10 mg  Take 10 mg by mouth 2 times daily  Refills: 0     ferrous sulfate 140 (45 Fe) MG Tbcr CR tablet      Dose: 140 mg  Take 140 mg by mouth daily  Refills: 0     prenatal multivitamin w/iron 27-0.8 MG tablet      Dose: 1 tablet  Take 1 tablet by mouth daily  Refills: 0               Where to  get your medicines        These medications were sent to [id number] - [place], [place] - [address] [address]  [address], [facility] [address]      Phone: [phone]   acetaminophen 325 MG tablet  ibuprofen 800 MG tablet          Discharge Plan:    Follow up with /STEPHON:  [facility] in 6 weeks for a postpartum visit   Patient Instructions:      Physical activity: As tolerated. Nothing in the vagina for 6 weeks.     Diet:  Regular. Drink 100 oz (3 liters) daily.     Medication: As listed above. May alternate ibuprofen and acetaminophen for pain management.       Physician/CNM: CATHERINE WaddellM    Name:  Darcy Ruvalcaba  :  1995  MRN:  [id number]

## 2024-04-19 NOTE — PLAN OF CARE
Goal Outcome Evaluation:  Patient is ready to bed discharge home, all instructions given at this time.      Plan of Care Reviewed With: patient

## 2024-06-25 ENCOUNTER — PATIENT OUTREACH (OUTPATIENT)
Dept: CARE COORDINATION | Facility: CLINIC | Age: 29
End: 2024-06-25
Payer: COMMERCIAL

## 2024-08-11 ENCOUNTER — HEALTH MAINTENANCE LETTER (OUTPATIENT)
Age: 29
End: 2024-08-11

## 2025-08-16 ENCOUNTER — HEALTH MAINTENANCE LETTER (OUTPATIENT)
Age: 30
End: 2025-08-16

## (undated) DEVICE — DECANTER VIAL 2006S

## (undated) DEVICE — PREP DYNA-HEX 4% CHG SCRUB 4OZ BOTTLE MDS098710

## (undated) DEVICE — GLOVE PI ULTRATCH M LF SZ 6.5 PF CUFF TEXT STRL LF 42665

## (undated) DEVICE — CUSTOM PACK PERI GYN SMA5BPGHEA

## (undated) DEVICE — PACK MINOR SINGLE BASIN SSK3001

## (undated) DEVICE — MAT FLOOR SURGICAL 40X38 0702140238

## (undated) DEVICE — SOL WATER IRRIG 1000ML BOTTLE 2F7114

## (undated) DEVICE — DRSG TELFA 3X4" 1050

## (undated) DEVICE — SUTURE VICRYL+ 3-0 27IN CT-1 UND VCP258H

## (undated) DEVICE — BRIEF STRETCH XL MPS40

## (undated) RX ORDER — ONDANSETRON 2 MG/ML
INJECTION INTRAMUSCULAR; INTRAVENOUS
Status: DISPENSED
Start: 2024-04-17

## (undated) RX ORDER — LIDOCAINE HYDROCHLORIDE 10 MG/ML
INJECTION, SOLUTION EPIDURAL; INFILTRATION; INTRACAUDAL; PERINEURAL
Status: DISPENSED
Start: 2024-04-17

## (undated) RX ORDER — FENTANYL CITRATE 50 UG/ML
INJECTION, SOLUTION INTRAMUSCULAR; INTRAVENOUS
Status: DISPENSED
Start: 2024-04-17

## (undated) RX ORDER — PROPOFOL 10 MG/ML
INJECTION, EMULSION INTRAVENOUS
Status: DISPENSED
Start: 2024-04-17

## (undated) RX ORDER — DEXAMETHASONE SODIUM PHOSPHATE 4 MG/ML
INJECTION, SOLUTION INTRA-ARTICULAR; INTRALESIONAL; INTRAMUSCULAR; INTRAVENOUS; SOFT TISSUE
Status: DISPENSED
Start: 2024-04-17